# Patient Record
Sex: FEMALE | Race: WHITE | NOT HISPANIC OR LATINO | URBAN - METROPOLITAN AREA
[De-identification: names, ages, dates, MRNs, and addresses within clinical notes are randomized per-mention and may not be internally consistent; named-entity substitution may affect disease eponyms.]

---

## 2017-07-17 RX ORDER — CLONAZEPAM 0.5 MG/1
0.5 TABLET ORAL DAILY
COMMUNITY

## 2017-07-17 RX ORDER — FLUOXETINE 10 MG/1
10 TABLET, FILM COATED ORAL DAILY
COMMUNITY

## 2017-07-19 ENCOUNTER — HOSPITAL ENCOUNTER (OUTPATIENT)
Facility: HOSPITAL | Age: 57
Setting detail: OUTPATIENT SURGERY
Discharge: HOME | End: 2017-07-19
Attending: INTERNAL MEDICINE | Admitting: INTERNAL MEDICINE
Payer: COMMERCIAL

## 2017-07-19 ENCOUNTER — GENERIC CONVERSION - ENCOUNTER (OUTPATIENT)
Dept: OTHER | Facility: OTHER | Age: 57
End: 2017-07-19

## 2017-07-19 ENCOUNTER — ANESTHESIA (OUTPATIENT)
Dept: PERIOP | Facility: HOSPITAL | Age: 57
End: 2017-07-19
Payer: COMMERCIAL

## 2017-07-19 ENCOUNTER — ANESTHESIA EVENT (OUTPATIENT)
Dept: PERIOP | Facility: HOSPITAL | Age: 57
End: 2017-07-19
Payer: COMMERCIAL

## 2017-07-19 VITALS
RESPIRATION RATE: 18 BRPM | TEMPERATURE: 97.8 F | WEIGHT: 181 LBS | DIASTOLIC BLOOD PRESSURE: 76 MMHG | HEIGHT: 69 IN | OXYGEN SATURATION: 100 % | HEART RATE: 68 BPM | SYSTOLIC BLOOD PRESSURE: 127 MMHG | BODY MASS INDEX: 26.81 KG/M2

## 2017-07-19 RX ORDER — PROPOFOL 10 MG/ML
INJECTION, EMULSION INTRAVENOUS AS NEEDED
Status: DISCONTINUED | OUTPATIENT
Start: 2017-07-19 | End: 2017-07-19 | Stop reason: SURG

## 2017-07-19 RX ORDER — SODIUM CHLORIDE, SODIUM LACTATE, POTASSIUM CHLORIDE, CALCIUM CHLORIDE 600; 310; 30; 20 MG/100ML; MG/100ML; MG/100ML; MG/100ML
125 INJECTION, SOLUTION INTRAVENOUS CONTINUOUS
Status: CANCELLED | OUTPATIENT
Start: 2017-07-19

## 2017-07-19 RX ORDER — SODIUM CHLORIDE, SODIUM LACTATE, POTASSIUM CHLORIDE, CALCIUM CHLORIDE 600; 310; 30; 20 MG/100ML; MG/100ML; MG/100ML; MG/100ML
INJECTION, SOLUTION INTRAVENOUS CONTINUOUS PRN
Status: DISCONTINUED | OUTPATIENT
Start: 2017-07-19 | End: 2017-07-19 | Stop reason: SURG

## 2017-07-19 RX ADMIN — PROPOFOL 50 MG: 10 INJECTION, EMULSION INTRAVENOUS at 09:58

## 2017-07-19 RX ADMIN — SODIUM CHLORIDE, SODIUM LACTATE, POTASSIUM CHLORIDE, AND CALCIUM CHLORIDE: .6; .31; .03; .02 INJECTION, SOLUTION INTRAVENOUS at 09:48

## 2017-07-19 RX ADMIN — PROPOFOL 20 MG: 10 INJECTION, EMULSION INTRAVENOUS at 09:59

## 2017-07-19 RX ADMIN — PROPOFOL 20 MG: 10 INJECTION, EMULSION INTRAVENOUS at 09:55

## 2017-07-19 RX ADMIN — PROPOFOL 30 MG: 10 INJECTION, EMULSION INTRAVENOUS at 09:52

## 2017-07-19 RX ADMIN — PROPOFOL 100 MG: 10 INJECTION, EMULSION INTRAVENOUS at 09:50

## 2018-08-06 ENCOUNTER — TRANSCRIBE ORDERS (OUTPATIENT)
Dept: ADMINISTRATIVE | Facility: HOSPITAL | Age: 58
End: 2018-08-06

## 2018-08-06 DIAGNOSIS — R09.89 BRUIT: Primary | ICD-10-CM

## 2018-08-08 ENCOUNTER — HOSPITAL ENCOUNTER (OUTPATIENT)
Dept: ULTRASOUND IMAGING | Facility: HOSPITAL | Age: 58
Discharge: HOME | End: 2018-08-08
Payer: COMMERCIAL

## 2018-08-08 DIAGNOSIS — R09.89 BRUIT: ICD-10-CM

## 2018-08-08 PROCEDURE — 93880 EXTRACRANIAL BILAT STUDY: CPT

## 2018-08-08 PROCEDURE — 93880 EXTRACRANIAL BILAT STUDY: CPT | Performed by: RADIOLOGY

## 2021-04-06 DIAGNOSIS — Z23 ENCOUNTER FOR IMMUNIZATION: ICD-10-CM

## 2022-12-19 ENCOUNTER — PREP FOR PROCEDURE (OUTPATIENT)
Dept: GASTROENTEROLOGY | Facility: CLINIC | Age: 62
End: 2022-12-19

## 2022-12-19 ENCOUNTER — TELEPHONE (OUTPATIENT)
Dept: GASTROENTEROLOGY | Facility: CLINIC | Age: 62
End: 2022-12-19

## 2022-12-19 DIAGNOSIS — Z86.010 HISTORY OF COLON POLYPS: Primary | ICD-10-CM

## 2022-12-19 NOTE — TELEPHONE ENCOUNTER
Scheduled date of colonoscopy (as of today): 2/23/23    Physician performing colonoscopy: Dr Ayde Garcia    Location of colonoscopy: [facility]

## 2022-12-19 NOTE — TELEPHONE ENCOUNTER
12/19/22  Screened by: Laly Infante    Referring Provider Dr Jono Ye: There is no height or weight on file to calculate BMI  Has patient been referred for a routine screening Colonoscopy? yes  Is the patient between 39-70 years old? yes      Previous Colonoscopy yes   If yes:    Date: 5 yrs ago    Facility:     Reason:       SCHEDULING STAFF: If the patient is between 45yrs-49yrs, please advise patient to confirm benefits/coverage with their insurance company for a routine screening colonoscopy, some insurance carriers will only cover at [address] or older  If the patient is over 66years old, please schedule an office visit  Does the patient want to see a Gastroenterologist prior to their procedure OR are they having any GI symptoms? no    Has the patient been hospitalized or had abdominal surgery in the past 6 months? no    Does the patient use supplemental oxygen? no    Does the patient take Coumadin, Lovenox, Plavix, Elliquis, Xarelto, or other blood thinning medication? no    Has the patient had a stroke, cardiac event, or stent placed in the past year? no     PT PASSED OA  SCHEDULING STAFF: If patient answers NO to above questions, then schedule procedure  If patient answers YES to above questions, then schedule office appointment  If patient is between 45yrs - 49yrs, please advise patient that we will have to confirm benefits & coverage with their insurance company for a routine screening colonoscopy  [id number]

## 2022-12-28 ENCOUNTER — APPOINTMENT (EMERGENCY)
Dept: VASCULAR ULTRASOUND | Facility: HOSPITAL | Age: 62
End: 2022-12-28

## 2022-12-28 ENCOUNTER — HOSPITAL ENCOUNTER (EMERGENCY)
Facility: HOSPITAL | Age: 62
Discharge: HOME | End: 2022-12-28
Attending: EMERGENCY MEDICINE

## 2022-12-28 ENCOUNTER — APPOINTMENT (EMERGENCY)
Dept: RADIOLOGY | Facility: HOSPITAL | Age: 62
End: 2022-12-28

## 2022-12-28 VITALS
RESPIRATION RATE: 20 BRPM | TEMPERATURE: 98.6 F | HEART RATE: 77 BPM | OXYGEN SATURATION: 95 % | SYSTOLIC BLOOD PRESSURE: 167 MMHG | DIASTOLIC BLOOD PRESSURE: 79 MMHG

## 2022-12-28 DIAGNOSIS — M25.561 POSTERIOR RIGHT KNEE PAIN: Primary | ICD-10-CM

## 2022-12-28 RX ORDER — IBUPROFEN 400 MG/1
400 TABLET ORAL ONCE
Status: COMPLETED | OUTPATIENT
Start: 2022-12-28 | End: 2022-12-28

## 2022-12-28 RX ADMIN — IBUPROFEN 400 MG: 400 TABLET ORAL at 11:23

## 2022-12-30 NOTE — ED PROVIDER NOTES
History  Chief Complaint   Patient presents with   • Leg Pain     Pt c/o R leg pain that started yesterday, denies injury or recent travel     38-year-old female presenting with right posterior knee pain since yesterday  Patient denies any injury or twisting or any recent travel  Patient has not been sedentary and has been actively moving around  Patient has no history of blood clots  Is not on any blood thinners or aspirin  Denies any swelling or redness in the area but states that it hurts to palpation on the posterior side of her knee  Patient denies any fever, chest pain, shortness of breath, nausea, vomiting, diarrhea, constipation  Patient states that she does not know if she is getting tick bites  But has not been outside recently  Prior to Admission Medications   Prescriptions Last Dose Informant Patient Reported? Taking? FLUoxetine (PROzac) 10 MG tablet   Yes No   Sig: Take 10 mg by mouth daily   clonazePAM (KlonoPIN) 0 5 mg tablet   Yes No   Sig: Take 0 5 mg by mouth daily      Facility-Administered Medications: None       Past Medical History:   Diagnosis Date   • Anxiety    • Panic attacks    • Panic attacks        Past Surgical History:   Procedure Laterality Date   • APPENDECTOMY     •  SECTION     • COLONOSCOPY     • OR COLONOSCOPY FLX DX W/COLLJ SPEC WHEN PFRMD N/A 2017    Procedure: COLONOSCOPY;  Surgeon: Kathy Liu MD;  Location: [facility]; Service: Gastroenterology       History reviewed  No pertinent family history  I have reviewed and agree with the history as documented  E-Cigarette/Vaping     E-Cigarette/Vaping Substances          Review of Systems   Constitutional: Negative for chills and fever  HENT: Negative for ear pain and sore throat  Eyes: Negative for pain and visual disturbance  Respiratory: Negative for cough and shortness of breath  Cardiovascular: Negative for chest pain and palpitations     Gastrointestinal: Negative for abdominal pain, constipation, diarrhea, nausea and vomiting  Genitourinary: Negative for dysuria and hematuria  Musculoskeletal: Positive for arthralgias  Negative for back pain, gait problem, joint swelling, myalgias, neck pain and neck stiffness  Skin: Negative for color change and rash  Neurological: Negative for dizziness, seizures, syncope, weakness and headaches  All other systems reviewed and are negative  Physical Exam  Physical Exam  Vitals and nursing note reviewed  Constitutional:       General: She is not in acute distress  Appearance: She is well-developed  She is not ill-appearing  HENT:      Head: Normocephalic and atraumatic  Eyes:      Conjunctiva/sclera: Conjunctivae normal    Cardiovascular:      Rate and Rhythm: Normal rate and regular rhythm  Pulses: Normal pulses  Heart sounds: Normal heart sounds  No murmur heard  No friction rub  No gallop  Pulmonary:      Effort: Pulmonary effort is normal  No respiratory distress  Breath sounds: Normal breath sounds  Abdominal:      Palpations: Abdomen is soft  Tenderness: There is no abdominal tenderness  Musculoskeletal:         General: No swelling, tenderness, deformity or signs of injury  Cervical back: Neck supple  Right lower leg: No edema  Left lower leg: No edema  Skin:     General: Skin is warm and dry  Capillary Refill: Capillary refill takes less than 2 seconds  Coloration: Skin is not jaundiced or pale  Findings: No bruising, erythema, lesion or rash  Neurological:      Mental Status: She is alert     Psychiatric:         Mood and Affect: Mood normal          Vital Signs  ED Triage Vitals   Temperature Pulse Respirations Blood Pressure SpO2   12/28/22 1053 12/28/22 1053 12/28/22 1053 12/28/22 1053 12/28/22 1053   98 6 °F (37 °C) 77 20 167/79 95 %      Temp Source Heart Rate Source Patient Position - Orthostatic VS BP Location FiO2 (%)   12/28/22 1053 12/28/22 [address] 12/28/22 1053 12/28/22 1053 --   Oral Monitor Sitting Right arm       Pain Score       12/28/22 1123       7           Vitals:    12/28/22 1053   BP: 167/79   Pulse: 77   Patient Position - Orthostatic VS: Sitting         Visual Acuity      ED Medications  Medications   ibuprofen (MOTRIN) tablet 400 mg (400 mg Oral Given 12/28/22 1123)       Diagnostic Studies  Results Reviewed     None                 VAS lower limb venous duplex study, unilateral/limited   Final Result by Britt Mayen MD (12/28 6853)      XR knee 4+ vw right injury   ED Interpretation by Alberto Gomez PA-C (12/28 5816)   No acute fractures  Possible soft tissue swelling posterior knee  Final Result by Yvette Knight MD (12/28 5765)      No acute osseous abnormality  Workstation performed: [id number]                    Procedures  Procedures         ED Course                                             MDM  Number of Diagnoses or Management Options  Posterior right knee pain  Diagnosis management comments: 23-year-old female presenting with posterior right knee pain  This happened abruptly and had no precipitating cause  Patient does not have a history of any osteoarthritis  We will rule out any blood clots with vascular ultrasound of the left lower extremity  X-ray of the knees to rule out any fractures  No fracture found on x-ray  No blood clots found on the vascular ultrasound  We will send patient to orthopedics for further evaluation of possible osteoarthritis  Patient was agreeable to these terms and had at home medication that she was okay with taking        Disposition  Final diagnoses:   Posterior right knee pain     Time reflects when diagnosis was documented in both MDM as applicable and the Disposition within this note     Time User Action Codes Description Comment    12/28/2022 12:18 PM Neomia Duet Add [M25 561] Posterior right knee pain       ED Disposition     ED Disposition   Discharge Condition   Stable    Date/Time   Wed Dec 28, 2022 12:17 PM    Comment   Jane Ball discharge to home/self care                 Follow-up Information     Follow up With Specialties Details Why Contact Info Additional Information    [address] [facility] Emergency Medicine Go to  If symptoms worsen [address] [facility] [address] [facility], [address] [place], [place], [address], [place] Family Medicine Schedule an appointment as soon as possible for a visit  As needed [address] [place] [phone] [facility] [place] [facility] Schedule an appointment as soon as possible for a visit  As needed [address] [facility] [phone]  [address] [facility] [place], [address], [address] [place], [place], [address] [facility]          Discharge Medication List as of 12/28/2022 12:26 PM      CONTINUE these medications which have NOT CHANGED    Details   clonazePAM (KlonoPIN) 0 5 mg tablet Take 0 5 mg by mouth daily, Historical Med      FLUoxetine (PROzac) 10 MG tablet Take 10 mg by mouth daily, Historical Med                 PDMP Review     None          ED Provider  Electronically Signed by           Gideon Hernandez PA-C  12/30/22 7285

## 2023-01-04 ENCOUNTER — OFFICE VISIT (OUTPATIENT)
Dept: OBGYN CLINIC | Facility: HOSPITAL | Age: 63
End: 2023-01-04

## 2023-01-04 VITALS
BODY MASS INDEX: 26.73 KG/M2 | WEIGHT: 180.5 LBS | DIASTOLIC BLOOD PRESSURE: 88 MMHG | SYSTOLIC BLOOD PRESSURE: 146 MMHG | HEART RATE: 74 BPM | HEIGHT: 69 IN

## 2023-01-04 DIAGNOSIS — M25.561 POSTERIOR RIGHT KNEE PAIN: ICD-10-CM

## 2023-01-04 DIAGNOSIS — M17.11 PRIMARY OSTEOARTHRITIS OF RIGHT KNEE: Primary | ICD-10-CM

## 2023-01-04 NOTE — PROGRESS NOTES
Assessment:  1  Primary osteoarthritis of right knee        2  Posterior right knee pain  Ambulatory Referral to Orthopedic Surgery          Plan:  Right knee arthralgia now resolved  · Radiograph displays mild medial and patellofemoral arthritic changes  · Patient provided with right knee exercises  · The patient can follow up as needed and is welcome to return at any point with any new or old issue  To do next visit:  Return if symptoms worsen or fail to improve  The above stated was discussed in layman's terms and the patient expressed understanding  All questions were answered to the patient's satisfaction  Scribe Attestation    I,:  Alicia Card am acting as a scribe while in the presence of the attending physician :       I,:  Pippa Munson MD personally performed the services described in this documentation    as scribed in my presence :             Subjective:   Gael Solomon is a 57 y o  female who presents for initial evaluation of right knee  She is here from the ED 2022  She did have a flare of symptoms in which is now resolved  She is near % better since onset  Today she has no right knee complains yet did have significant posterior right knee pain and stiffness that eventually became entire knee pain  She did use IBU with benefit  She denies past injections or surgery  Review of systems negative unless otherwise specified in HPI    Past Medical History:   Diagnosis Date   • Anxiety    • Panic attacks    • Panic attacks        Past Surgical History:   Procedure Laterality Date   • APPENDECTOMY     •  SECTION     • COLONOSCOPY     • [place] COLONOSCOPY FLX DX W/COLLJ SPEC WHEN PFRMD N/A 2017    Procedure: COLONOSCOPY;  Surgeon: Ryan Soria MD;  Location: [facility]; Service: Gastroenterology       History reviewed  No pertinent family history      Social History     Occupational History   • Not on file   Tobacco Use   • Smoking status: Former     Types: Cigarettes   • Smokeless tobacco: Never   Vaping Use   • Vaping Use: Not on file   Substance and Sexual Activity   • Alcohol use: Not on file   • Drug use: Not on file   • Sexual activity: Not on file         Current Outpatient Medications:   •  clonazePAM (KlonoPIN) 0 5 mg tablet, Take 0 5 mg by mouth daily, Disp: , Rfl:   •  FLUoxetine (PROzac) 10 MG tablet, Take 10 mg by mouth daily, Disp: , Rfl:     No Known Allergies         Vitals:    01/04/23 1006   BP: 146/88   Pulse: 74       Objective:  Physical exam  · General: Awake, Alert, Oriented  · Eyes: Pupils equal, round and reactive to light  · Heart: regular rate and rhythm  · Lungs: No audible wheezing  · Abdomen: soft                    Ortho Exam  Right knee:  No erythema or ecchymosis  No effusion or swelling  Normal strength  Good ROM   Calf compartments soft and supple  Sensation intact  Toes are warm sensate and mobile      Diagnostics, reviewed and taken today if performed as documented: The attending physician has personally reviewed the pertinent films in PACS and interpretation is as follows:  Right knee x-ray:  Mild medial and patellofemoral compartment arthritic changes  Procedures, if performed today:    Procedures    None performed      Portions of the record may have been created with voice recognition software  Occasional wrong word or "sound a like" substitutions may have occurred due to the inherent limitations of voice recognition software  Read the chart carefully and recognize, using context, where substitutions have occurred

## 2023-02-16 ENCOUNTER — TELEPHONE (OUTPATIENT)
Dept: GASTROENTEROLOGY | Facility: CLINIC | Age: 63
End: 2023-02-16

## 2023-02-16 NOTE — TELEPHONE ENCOUNTER
Patients GI provider:  Dr Roge Zayas    Number to return call: [phone]     Reason for call: Pt called in regarding her prep information  Pt states she has not received her prep instructions yet and her procedure is scheduled for 2/23/2023  Pt would like prep sent to her email       Scheduled procedure/appointment date if applicable: 2/59/9926

## 2023-02-23 ENCOUNTER — ANESTHESIA (OUTPATIENT)
Dept: GASTROENTEROLOGY | Facility: HOSPITAL | Age: 63
End: 2023-02-23

## 2023-02-23 ENCOUNTER — HOSPITAL ENCOUNTER (OUTPATIENT)
Dept: GASTROENTEROLOGY | Facility: HOSPITAL | Age: 63
Setting detail: OUTPATIENT SURGERY
Discharge: HOME | End: 2023-02-23
Attending: INTERNAL MEDICINE | Admitting: INTERNAL MEDICINE

## 2023-02-23 ENCOUNTER — ANESTHESIA EVENT (OUTPATIENT)
Dept: GASTROENTEROLOGY | Facility: HOSPITAL | Age: 63
End: 2023-02-23

## 2023-02-23 VITALS
HEART RATE: 62 BPM | DIASTOLIC BLOOD PRESSURE: 61 MMHG | WEIGHT: 182.54 LBS | BODY MASS INDEX: 27.04 KG/M2 | TEMPERATURE: 98.7 F | HEIGHT: 69 IN | RESPIRATION RATE: 16 BRPM | SYSTOLIC BLOOD PRESSURE: 142 MMHG | OXYGEN SATURATION: 98 %

## 2023-02-23 DIAGNOSIS — Z86.010 HISTORY OF COLON POLYPS: ICD-10-CM

## 2023-02-23 RX ORDER — SODIUM CHLORIDE, SODIUM LACTATE, POTASSIUM CHLORIDE, CALCIUM CHLORIDE 600; 310; 30; 20 MG/100ML; MG/100ML; MG/100ML; MG/100ML
125 INJECTION, SOLUTION INTRAVENOUS CONTINUOUS
Status: CANCELLED | OUTPATIENT
Start: 2023-02-23

## 2023-02-23 RX ORDER — SODIUM CHLORIDE, SODIUM LACTATE, POTASSIUM CHLORIDE, CALCIUM CHLORIDE 600; 310; 30; 20 MG/100ML; MG/100ML; MG/100ML; MG/100ML
125 INJECTION, SOLUTION INTRAVENOUS CONTINUOUS
Status: DISCONTINUED | OUTPATIENT
Start: 2023-02-23 | End: 2023-02-27 | Stop reason: HOSPADM

## 2023-02-23 RX ORDER — SODIUM CHLORIDE, SODIUM LACTATE, POTASSIUM CHLORIDE, CALCIUM CHLORIDE 600; 310; 30; 20 MG/100ML; MG/100ML; MG/100ML; MG/100ML
INJECTION, SOLUTION INTRAVENOUS CONTINUOUS PRN
Status: DISCONTINUED | OUTPATIENT
Start: 2023-02-23 | End: 2023-02-23

## 2023-02-23 RX ORDER — PROPOFOL 10 MG/ML
INJECTION, EMULSION INTRAVENOUS AS NEEDED
Status: DISCONTINUED | OUTPATIENT
Start: 2023-02-23 | End: 2023-02-23

## 2023-02-23 RX ADMIN — PROPOFOL 30 MG: 10 INJECTION, EMULSION INTRAVENOUS at 11:12

## 2023-02-23 RX ADMIN — PROPOFOL 100 MG: 10 INJECTION, EMULSION INTRAVENOUS at 11:07

## 2023-02-23 RX ADMIN — PROPOFOL 20 MG: 10 INJECTION, EMULSION INTRAVENOUS at 11:09

## 2023-02-23 RX ADMIN — SODIUM CHLORIDE, SODIUM LACTATE, POTASSIUM CHLORIDE, AND CALCIUM CHLORIDE: .6; .31; .03; .02 INJECTION, SOLUTION INTRAVENOUS at 10:40

## 2023-02-23 RX ADMIN — SODIUM CHLORIDE, SODIUM LACTATE, POTASSIUM CHLORIDE, AND CALCIUM CHLORIDE 125 ML/HR: .6; .31; .03; .02 INJECTION, SOLUTION INTRAVENOUS at 10:31

## 2023-02-23 RX ADMIN — PROPOFOL 20 MG: 10 INJECTION, EMULSION INTRAVENOUS at 11:16

## 2023-02-23 NOTE — ANESTHESIA POSTPROCEDURE EVALUATION
Post-Op Assessment Note    CV Status:  Stable  Pain Score: 0    Pain management: adequate     Mental Status:  Alert and awake   Hydration Status:  Stable   PONV Controlled:  None   Airway Patency:  Patent and adequate      Post Op Vitals Reviewed: Yes      Staff: Anesthesiologist, CRNA         No notable events documented

## 2023-02-23 NOTE — H&P
History and Physical - [facility]  Charline Starkey 57 y o  female MRN: [id number]                  HPI: Charline Starkey is a 57y o  year old female who presents for colonoscopy for history of colon polyps      REVIEW OF SYSTEMS: Per the HPI, and otherwise unremarkable  Historical Information   Past Medical History:   Diagnosis Date   • Anxiety    • Panic attacks    • Panic attacks      Past Surgical History:   Procedure Laterality Date   • APPENDECTOMY     •  SECTION     • COLONOSCOPY     • [place] COLONOSCOPY FLX DX W/COLLJ SPEC WHEN PFRMD N/A 2017    Procedure: COLONOSCOPY;  Surgeon: Naresh Brock MD;  Location: [facility]; Service: Gastroenterology     Social History   Social History     Substance and Sexual Activity   Alcohol Use Yes    Comment: OCCASIONAL     Social History     Substance and Sexual Activity   Drug Use Never     Social History     Tobacco Use   Smoking Status Former   • Types: Cigarettes   • Quit date:    • Years since quitting: 15 1   Smokeless Tobacco Never     History reviewed  No pertinent family history  Meds/Allergies     (Not in a hospital admission)      No Known Allergies    Objective     Blood pressure 144/88, pulse 70, temperature (!) 97 3 °F (36 3 °C), temperature source Temporal, resp  rate 16, height 5' 9" (1 753 m), weight 82 8 kg (182 lb 8 7 oz), SpO2 98 %  PHYSICAL EXAM    /88   Pulse 70   Temp (!) 97 3 °F (36 3 °C) (Temporal)   Resp 16   Ht 5' 9" (1 753 m)   Wt 82 8 kg (182 lb 8 7 oz)   SpO2 98%   BMI 26 96 kg/m²       Gen: NAD  CV: RRR  CHEST: Clear  ABD: soft, NT/ND  EXT: no edema      ASSESSMENT/PLAN:  This is a 57y o  year old female here for colonoscopy, and she is stable and optimized for her procedure

## 2023-02-23 NOTE — ANESTHESIA PREPROCEDURE EVALUATION
Procedure:  COLONOSCOPY    Relevant Problems   ANESTHESIA (within normal limits)   (-) History of anesthesia complications      CARDIO (within normal limits)      ENDO (within normal limits)      GI/HEPATIC  Confirmed NPO appropriate  s/p bowel prep      /RENAL (within normal limits)      HEMATOLOGY (within normal limits)      MUSCULOSKELETAL   (+) Primary osteoarthritis of right knee      NEURO/PSYCH (within normal limits)      PULMONARY (within normal limits)   (-) Smoking   (-) URI (upper respiratory infection)        Physical Exam    Airway    Mallampati score: II  TM Distance: >3 FB  Neck ROM: full     Dental       Cardiovascular  Rhythm: regular, Rate: normal,     Pulmonary  Breath sounds clear to auscultation,     Other Findings        Anesthesia Plan  ASA Score- 1     Anesthesia Type- IV sedation with anesthesia with ASA Monitors  Additional Monitors:   Airway Plan:     Comment: I discussed the risks and benefits of IV sedation anesthesia including the possibility of the need to convert to general anesthesia and the potential risk of awareness  The patient was given the opportunity to ask questions, which were answered          Plan Factors-Exercise tolerance (METS): >4 METS  Chart reviewed  Patient is not a current smoker  Induction- intravenous  Postoperative Plan-     Informed Consent- Anesthetic plan and risks discussed with patient and spouse  I personally reviewed this patient with the CRNA  Discussed and agreed on the Anesthesia Plan with the CRNA  Courtney Moreno

## 2023-12-04 ENCOUNTER — HOSPITAL ENCOUNTER (EMERGENCY)
Facility: HOSPITAL | Age: 63
Discharge: HOME | End: 2023-12-04
Attending: EMERGENCY MEDICINE
Payer: MEDICARE

## 2023-12-04 ENCOUNTER — APPOINTMENT (EMERGENCY)
Dept: RADIOLOGY | Facility: HOSPITAL | Age: 63
End: 2023-12-04
Payer: MEDICARE

## 2023-12-04 VITALS
DIASTOLIC BLOOD PRESSURE: 71 MMHG | SYSTOLIC BLOOD PRESSURE: 148 MMHG | HEART RATE: 59 BPM | OXYGEN SATURATION: 97 % | BODY MASS INDEX: 29.17 KG/M2 | WEIGHT: 197.53 LBS | TEMPERATURE: 97.9 F | RESPIRATION RATE: 16 BRPM

## 2023-12-04 DIAGNOSIS — R07.9 CHEST PAIN: Primary | ICD-10-CM

## 2023-12-04 DIAGNOSIS — I16.0 HYPERTENSIVE URGENCY: ICD-10-CM

## 2023-12-04 LAB
2HR DELTA HS TROPONIN: 0 NG/L
ALBUMIN SERPL BCP-MCNC: 3.9 G/DL (ref 3.5–5)
ALP SERPL-CCNC: 75 U/L (ref 34–104)
ALT SERPL W P-5'-P-CCNC: 22 U/L (ref 7–52)
ANION GAP SERPL CALCULATED.3IONS-SCNC: 3 MMOL/L
AST SERPL W P-5'-P-CCNC: 20 U/L (ref 13–39)
ATRIAL RATE: 65 BPM
BASOPHILS # BLD AUTO: 0.05 THOUSANDS/ÂΜL (ref 0–0.1)
BASOPHILS NFR BLD AUTO: 1 % (ref 0–1)
BILIRUB SERPL-MCNC: 0.46 MG/DL (ref 0.2–1)
BUN SERPL-MCNC: 19 MG/DL (ref 5–25)
CALCIUM SERPL-MCNC: 9.2 MG/DL (ref 8.4–10.2)
CARDIAC TROPONIN I PNL SERPL HS: 3 NG/L
CARDIAC TROPONIN I PNL SERPL HS: 3 NG/L
CHLORIDE SERPL-SCNC: 105 MMOL/L (ref 96–108)
CO2 SERPL-SCNC: 29 MMOL/L (ref 21–32)
CREAT SERPL-MCNC: 0.83 MG/DL (ref 0.6–1.3)
EOSINOPHIL # BLD AUTO: 0.09 THOUSAND/ÂΜL (ref 0–0.61)
EOSINOPHIL NFR BLD AUTO: 2 % (ref 0–6)
ERYTHROCYTE [DISTWIDTH] IN BLOOD BY AUTOMATED COUNT: 12.3 % (ref 11.6–15.1)
GFR SERPL CREATININE-BSD FRML MDRD: 74 ML/MIN/1.73SQ M
GLUCOSE SERPL-MCNC: 101 MG/DL (ref 65–140)
HCT VFR BLD AUTO: 40 % (ref 34.8–46.1)
HGB BLD-MCNC: 13.3 G/DL (ref 11.5–15.4)
IMM GRANULOCYTES # BLD AUTO: 0.01 THOUSAND/UL (ref 0–0.2)
IMM GRANULOCYTES NFR BLD AUTO: 0 % (ref 0–2)
LYMPHOCYTES # BLD AUTO: 1.58 THOUSANDS/ÂΜL (ref 0.6–4.47)
LYMPHOCYTES NFR BLD AUTO: 28 % (ref 14–44)
MCH RBC QN AUTO: 30.4 PG (ref 26.8–34.3)
MCHC RBC AUTO-ENTMCNC: 33.3 G/DL (ref 31.4–37.4)
MCV RBC AUTO: 92 FL (ref 82–98)
MONOCYTES # BLD AUTO: 0.38 THOUSAND/ÂΜL (ref 0.17–1.22)
MONOCYTES NFR BLD AUTO: 7 % (ref 4–12)
NEUTROPHILS # BLD AUTO: 3.47 THOUSANDS/ÂΜL (ref 1.85–7.62)
NEUTS SEG NFR BLD AUTO: 62 % (ref 43–75)
NRBC BLD AUTO-RTO: 0 /100 WBCS
P AXIS: 52 DEGREES
PLATELET # BLD AUTO: 307 THOUSANDS/UL (ref 149–390)
PMV BLD AUTO: 9.7 FL (ref 8.9–12.7)
POTASSIUM SERPL-SCNC: 4.5 MMOL/L (ref 3.5–5.3)
PR INTERVAL: 152 MS
PROT SERPL-MCNC: 7 G/DL (ref 6.4–8.4)
QRS AXIS: 14 DEGREES
QRSD INTERVAL: 88 MS
QT INTERVAL: 386 MS
QTC INTERVAL: 401 MS
RBC # BLD AUTO: 4.37 MILLION/UL (ref 3.81–5.12)
SODIUM SERPL-SCNC: 137 MMOL/L (ref 135–147)
T WAVE AXIS: 62 DEGREES
VENTRICULAR RATE: 65 BPM
WBC # BLD AUTO: 5.58 THOUSAND/UL (ref 4.31–10.16)

## 2023-12-04 PROCEDURE — 99285 EMERGENCY DEPT VISIT HI MDM: CPT | Performed by: EMERGENCY MEDICINE

## 2023-12-04 PROCEDURE — 71045 X-RAY EXAM CHEST 1 VIEW: CPT

## 2023-12-04 PROCEDURE — 99285 EMERGENCY DEPT VISIT HI MDM: CPT

## 2023-12-04 PROCEDURE — 36415 COLL VENOUS BLD VENIPUNCTURE: CPT | Performed by: EMERGENCY MEDICINE

## 2023-12-04 PROCEDURE — 96374 THER/PROPH/DIAG INJ IV PUSH: CPT

## 2023-12-04 PROCEDURE — 80053 COMPREHEN METABOLIC PANEL: CPT | Performed by: EMERGENCY MEDICINE

## 2023-12-04 PROCEDURE — 93005 ELECTROCARDIOGRAM TRACING: CPT

## 2023-12-04 PROCEDURE — 84484 ASSAY OF TROPONIN QUANT: CPT | Performed by: EMERGENCY MEDICINE

## 2023-12-04 PROCEDURE — 85025 COMPLETE CBC W/AUTO DIFF WBC: CPT | Performed by: EMERGENCY MEDICINE

## 2023-12-04 RX ORDER — ONDANSETRON 2 MG/ML
4 INJECTION INTRAMUSCULAR; INTRAVENOUS ONCE
Status: COMPLETED | OUTPATIENT
Start: 2023-12-04 | End: 2023-12-04

## 2023-12-04 RX ADMIN — ONDANSETRON 4 MG: 2 INJECTION INTRAMUSCULAR; INTRAVENOUS at 10:23

## 2023-12-04 NOTE — DISCHARGE INSTRUCTIONS
Today you were seen in the emergency department for chest pain. Your workup included labs, EKG, chest x-ray, troponin. I believe your symptoms to be the result of noncardiac chest pain. At this time there does not appear to be an emergent life threatening cause to explain your symptoms. You are stable for discharge. Please follow up with your primary care provider in the next 2-3 days. Please review all results discussed today with your primary care provider. Please discuss with them with regards to further testing/cardiac outpatient follow-up. Please return to the emergency department as soon as possible if you develop uncontrollable fevers (Temp >100.4), uncontrollable pain, vomiting, chest pain, trouble breathing, passing out, sweating with chest pain or any other concerning symptoms. Thank you for choosing [facility] for your care.

## 2023-12-04 NOTE — ED PROVIDER NOTES
History  Chief Complaint   Patient presents with    Chest Pain     Pt presents to the ED with chest pain x 2hrs last night with nausea. Pt reports familial hx of cardiac problems. Pt reports having pain down her left arm the last 2 days. 57-year-old female with history of hypertension, anxiety presenting to the ED with complaints of intermittent left-sided chest pain associated with left arm pain and nausea for the past 2 days. Patient states that she has been having left-sided arm pain that radiates to her chest for the past 2 days. Pain is constant when it occurs, however occurs intermittently and randomly. Pain is unchanged with exertion. Last episode of pain was last night, and awoke her from sleep and reports mild nausea, shortness of breath and lightheadedness with it. She describes as a tightness and burning pain. she states the pain went away on its own, denies any new medication for the pain. She has a history of chronic neck pain with radiation of pain to her left arm, but this feels different. She does report some mild nausea with that as well associated with mild shortness of breath. At this time she only complains of mild nausea. She denies recent fevers, chills, headache, new/worsening neck pain/neck stiffness, visual changes, focal weakness, syncope, PND, palpitations, orthopnea, back pain, abdominal pain, vomiting, diarrhea, bloody/tarry stools, dysuria, hematuria, urgency, leg pain, leg swelling. Denies recent travel, recent surgeries, history of PE/DVT, or treatment for malignancy. This reports positive family history of cardiac disease, however is unsure exactly of diagnosis but states that her brother had a pacemaker placed at 71years old. Prior to Admission Medications   Prescriptions Last Dose Informant Patient Reported? Taking?    FLUoxetine (PROzac) 10 MG tablet   Yes No   Sig: Take 10 mg by mouth daily   clonazePAM (KlonoPIN) 0.5 mg tablet   Yes No   Sig: Take 0.5 mg by mouth daily      Facility-Administered Medications: None       Past Medical History:   Diagnosis Date    Anxiety     Panic attacks     Panic attacks        Past Surgical History:   Procedure Laterality Date    APPENDECTOMY       SECTION      COLONOSCOPY      MI COLONOSCOPY FLX DX W/COLLJ SPEC WHEN PFRMD N/A 2017    Procedure: COLONOSCOPY;  Surgeon: Linda Toro MD;  Location: [facility]; Service: Gastroenterology       History reviewed. No pertinent family history. I have reviewed and agree with the history as documented. E-Cigarette/Vaping     E-Cigarette/Vaping Substances    Nicotine No     THC No     CBD No     Flavoring No     Other No     Unknown No      Social History     Tobacco Use    Smoking status: Former     Types: Cigarettes     Quit date:      Years since quitting: 15.9    Smokeless tobacco: Never   Substance Use Topics    Alcohol use: Yes     Comment: OCCASIONAL    Drug use: Never        Review of Systems   Constitutional:  Negative for chills and fever. HENT:  Negative for congestion and sore throat. Eyes:  Negative for photophobia and visual disturbance. Respiratory:  Positive for chest tightness and shortness of breath. Negative for cough. Cardiovascular:  Positive for chest pain. Negative for palpitations and leg swelling. Gastrointestinal:  Positive for nausea. Negative for abdominal pain, diarrhea and vomiting. Genitourinary:  Negative for decreased urine volume, difficulty urinating, dysuria, flank pain, hematuria and vaginal bleeding. Musculoskeletal:  Positive for neck pain (Chronic.). Negative for back pain and neck stiffness. Skin:  Negative for pallor, rash and wound. Neurological:  Positive for light-headedness. Negative for dizziness, tremors, seizures, syncope, speech difficulty, weakness, numbness and headaches.        Physical Exam  ED Triage Vitals [23 0945]   Temperature Pulse Respirations Blood Pressure SpO2   97.9 °F (36.6 °C) 67 16 (!) 201/98 98 %      Temp Source Heart Rate Source Patient Position - Orthostatic VS BP Location FiO2 (%)   Oral Monitor Sitting Right arm --      Pain Score       2             Orthostatic Vital Signs  Vitals:    12/04/23 1100 12/04/23 1130 12/04/23 1200 12/04/23 1230   BP: (!) 172/79 158/73 (!) 182/79 162/77   Pulse: 60 57 59 57   Patient Position - Orthostatic VS: Sitting Sitting Sitting Sitting       Physical Exam  Vitals and nursing note reviewed. Constitutional:       General: She is not in acute distress. Appearance: She is well-developed and normal weight. She is not ill-appearing, toxic-appearing or diaphoretic. HENT:      Head: Normocephalic and atraumatic. Eyes:      General: No visual field deficit. Extraocular Movements: Extraocular movements intact. Pupils: Pupils are equal, round, and reactive to light. Cardiovascular:      Rate and Rhythm: Normal rate and regular rhythm. No extrasystoles are present. Pulses:           Radial pulses are 2+ on the right side and 2+ on the left side. Dorsalis pedis pulses are 2+ on the right side and 2+ on the left side. Posterior tibial pulses are 2+ on the right side and 2+ on the left side. Heart sounds: Normal heart sounds. No murmur heard. No systolic murmur is present. No friction rub. No gallop. No S3 sounds. Pulmonary:      Effort: No tachypnea, accessory muscle usage or respiratory distress. Breath sounds: Normal breath sounds. No decreased breath sounds, wheezing, rhonchi or rales. Chest:      Chest wall: No mass, tenderness or crepitus. Abdominal:      General: There is no abdominal bruit. Palpations: Abdomen is soft. Tenderness: There is no abdominal tenderness. There is no guarding or rebound. Musculoskeletal:         General: Normal range of motion. Cervical back: Normal range of motion and neck supple. Right lower leg: No tenderness. No edema.       Left lower leg: No tenderness. No edema. Comments: Negative Spurling's test.  Midline C-spine tenderness (consistent with patient's chronic neck pain). No midline T/L-spine tenderness. No calf pain on Squeeze. Lymphadenopathy:      Cervical: No cervical adenopathy. Skin:     General: Skin is warm and dry. Capillary Refill: Capillary refill takes less than 2 seconds. Coloration: Skin is not cyanotic or pale. Findings: No ecchymosis, erythema or rash. Nails: There is no clubbing. Neurological:      General: No focal deficit present. Mental Status: She is alert and oriented to person, place, and time. GCS: GCS eye subscore is 4. GCS verbal subscore is 5. GCS motor subscore is 6. Cranial Nerves: Cranial nerves 2-12 are intact. No cranial nerve deficit, dysarthria or facial asymmetry. Sensory: Sensation is intact. Motor: Motor function is intact. No weakness. Coordination: Coordination is intact. Gait: Gait is intact. Psychiatric:         Mood and Affect: Mood normal. Mood is not anxious. Behavior: Behavior normal. Behavior is not agitated.          ED Medications  Medications   ondansetron (ZOFRAN) injection 4 mg (4 mg Intravenous Given 12/4/23 1023)       Diagnostic Studies  Results Reviewed       Procedure Component Value Units Date/Time    HS Troponin I 2hr [[id number]]  (Normal) Collected: 12/04/23 1225    Lab Status: Final result Specimen: Blood from Arm, Left Updated: 12/04/23 1300     hs TnI 2hr 3 ng/L      Delta 2hr hsTnI 0 ng/L     HS Troponin I 4hr [[id number]]     Lab Status: No result Specimen: Blood     HS Troponin 0hr (reflex protocol) [[id number]]  (Normal) Collected: 12/04/23 1022    Lab Status: Final result Specimen: Blood from Arm, Left Updated: 12/04/23 1102     hs TnI 0hr 3 ng/L     Comprehensive metabolic panel [[id number]] Collected: 12/04/23 1022    Lab Status: Final result Specimen: Blood from Arm, Left Updated: 12/04/23 1058 Sodium 137 mmol/L      Potassium 4.5 mmol/L      Chloride 105 mmol/L      CO2 29 mmol/L      ANION GAP 3 mmol/L      BUN 19 mg/dL      Creatinine 0.83 mg/dL      Glucose 101 mg/dL      Calcium 9.2 mg/dL      AST 20 U/L      ALT 22 U/L      Alkaline Phosphatase 75 U/L      Total Protein 7.0 g/dL      Albumin 3.9 g/dL      Total Bilirubin 0.46 mg/dL      eGFR 74 ml/min/1.73sq m     Narrative:      National Kid[employer] Disease Foundation guidelines for Chronic Kidney Disease (CKD):     Stage 1 with normal or high GFR (GFR > 90 mL/min/1.73 square meters)    Stage 2 Mild CKD (GFR = 60-89 mL/min/1.73 square meters)    Stage 3A Moderate CKD (GFR = 45-59 mL/min/1.73 square meters)    Stage 3B Moderate CKD (GFR = 30-44 mL/min/1.73 square meters)    Stage 4 Severe CKD (GFR = 15-29 mL/min/1.73 square meters)    Stage 5 End Stage CKD (GFR <15 mL/min/1.73 square meters)  Note: GFR calculation is accurate only with a steady state creatinine    CBC and differential [[id number]] Collected: 12/04/23 1022    Lab Status: Final result Specimen: Blood from Arm, Left Updated: 12/04/23 1041     WBC 5.58 Thousand/uL      RBC 4.37 Million/uL      Hemoglobin 13.3 g/dL      Hematocrit 40.0 %      MCV 92 fL      MCH 30.4 pg      MCHC 33.3 g/dL      RDW 12.3 %      MPV 9.7 fL      Platelets 754 Thousands/uL      nRBC 0 /100 WBCs      Neutrophils Relative 62 %      Immat GRANS % 0 %      Lymphocytes Relative 28 %      Monocytes Relative 7 %      Eosinophils Relative 2 %      Basophils Relative 1 %      Neutrophils Absolute 3.47 Thousands/µL      Immature Grans Absolute 0.01 Thousand/uL      Lymphocytes Absolute 1.58 Thousands/µL      Monocytes Absolute 0.38 Thousand/µL      Eosinophils Absolute 0.09 Thousand/µL      Basophils Absolute 0.05 Thousands/µL                    XR chest 1 view portable   ED Interpretation by Arsen Miranda DO (12/04 1031)   Lungs are clear without focal consolidations or pneumothorax.   Cardiomediastinal silhouette appears normal.  No acute osseous abnormality. Final Result by Kofi Mercado MD (12/04 1141)      No acute cardiopulmonary disease. Workstation performed: [id number]               Procedures  ECG 12 Lead Documentation Only    Date/Time: 12/4/2023 9:50 AM    Performed by: Fady Ray DO  Authorized by: Fady Ray DO    Patient location:  ED  Previous ECG:     Previous ECG:  Unavailable  Interpretation:     Interpretation: normal    Rate:     ECG rate:  65    ECG rate assessment: normal    Rhythm:     Rhythm: sinus rhythm    Ectopy:     Ectopy: none    QRS:     QRS axis:  Normal    QRS intervals:  Normal  Conduction:     Conduction: normal    ST segments:     ST segments:  Normal  T waves:     T waves: normal          ED Course  ED Course as of 12/04/23 1312   Mon Dec 04, 2023   0946 Blood Pressure(!): 201/98   0950 Independently interpreted patient's EKG showing normal sinus rhythm at 65 bpm.  Normal axis. Normal intervals. No acute ischemic changes. No prior EKG for comparison. 1016 Blood Pressure(!): 176/83   1104 hs TnI 0hr: 3  Patient states that her pain occurred within 3 -4 hours of arrival to the emergency department. We will recheck delta troponin. 1104 Comprehensive metabolic panel  No acute electrolyte abnormality. Renal function at baseline. LFTs WNL. 1105 Blood Pressure: 159/74  BP improved without intervention. No evidence of endorgan damage at this time. Shared decision making was made to hold off on CT imaging as patient is alert and oriented x4 at her baseline mental status without any focal neurological deficits or headache. 1112 Reviewed all results with patient. Patient reports no recurrent chest pain. Currently asymptomatic, nausea resolved. Initial troponin of 3, will wait for delta given onset of symptoms.    [facility] hsTnI: 0             HEART Risk Score      Flowsheet Row Most Recent Value   Heart Score Risk Calculator    History 1 Filed at: 12/04/2023 1113   ECG 0 Filed at: 12/04/2023 1113   Age 2 Filed at: 12/04/2023 1113   Risk Factors 1 Filed at: 12/04/2023 1113   Troponin 0 Filed at: 12/04/2023 1113   HEART Score 4 Filed at: 12/04/2023 1113                PERC Rule for PE      Flowsheet Row Most Recent Value   PERC Rule for PE    Age >=50 1 Filed at: 12/04/2023 1312   HR >=100 0 Filed at: 12/04/2023 1312   O2 Sat on room air < 95% 0 Filed at: 12/04/2023 1312   History of PE or DVT 0 Filed at: 12/04/2023 1312   Recent trauma or surgery 0 Filed at: 12/04/2023 1312   Hemoptysis 0 Filed at: 12/04/2023 1312   Exogenous estrogen 0 Filed at: 12/04/2023 1312   Unilateral leg swelling 0 Filed at: 12/04/2023 1312   PERC Rule for PE Results 1 Filed at: 12/04/2023 1312                    Wells' Criteria for PE      Flowsheet Row Most Recent Value   Wells' Criteria for PE    Clinical signs and symptoms of DVT 0 Filed at: 12/04/2023 1312   PE is primary diagnosis or equally likely 0 Filed at: 12/04/2023 1312   HR >100 0 Filed at: 12/04/2023 1312   Immobilization at least 3 days or Surgery in the previous 4 weeks 0 Filed at: 12/04/2023 1312   Previous, objectively diagnosed PE or DVT 0 Filed at: 12/04/2023 1312   Hemoptysis 0 Filed at: 12/04/2023 1312   Malignancy with treatment within 6 months or palliative 0 Filed at: 12/04/2023 1312   Wells' Criteria Total 0 Filed at: 12/04/2023 1312              Medical Decision Making  Patient with history as above presented to triage with CC of " Patient presents with:  Chest Pain: Pt presents to the ED with chest pain x 2hrs last night with nausea. Pt reports familial hx of cardiac problems. Pt reports having pain down her left arm the last 2 days.    "    Hx obtained from pt and spouse  57-year-old female presenting with intermittent burning/tight left-sided chest pain radiating to her left arm associated with nausea, lightheadedness, dizziness and shortness of breath.   Last episode of pain was 3 to 4 hours prior to arrival.  Minimal CAD risk factors (including age), Exam without evidence of volume overload. EKG without signs of active ischemia. HEART score: 4. Given the timing of pain to ER presentation, plan to send delta troponin to evaluate for NSTEMI. Presentation not consistent with acute PE ([facility] low risk), pneumothorax, thoracic arotic dissection, cardiac effusion or tamponade. Patient's only symptom at this time is mild nausea. No prior stress or cardiac cath. Plan: labs, troponin, EKG, CXR, pain control, serial reassessment, antiemetic    Patient's work-up essentially negative. Delta troponin 0. EKG normal sinus rhythm without acute ischemic changes. CBC and CMP unremarkable. BP initially elevated at 201/98, improved to 160/77 without intervention. Changes are making was made to hold off on imaging of the head, as patient is without headache, focal neurological deficits and is alert x4 at her baseline mental status. No evidence or signs of endorgan damage. At this time because of chest pain/arm pain unclear. Suspect possible underlying MSK issue to explain her left arm pain. Patient otherwise asymptomatic, with resolution of pain and nausea. She is otherwise stable for discharge with outpatient follow-up. Reviewed strict return precautions with patient and she is agreeable with plan. Advised to recheck her blood pressure with her primary care doctor along with all results discussed today. Patient was nontoxic appearing and stable. Exam as above. Ambulatory and Tolerating PO. I have independently ordered, reviewed and interpreted the following: labs and/or imaging studies listed above, EKG above    Reviewed external records including notes, and prior labs/imaging results. DDX including but not limited to: ACS, MI, PE, PTX, pneumonia, dissection, pleurisy, pericarditis, myocarditis, rhabdomyolysis, GI etiology. Differential diagnosis includes but not limited to:  Uncontrolled HTN, Pain, Anxiety, Autonomic dysreflexia, Dehydration/KATARINA, Volume overload, vertigo, metabolic derangement, TIA, LUCIA; Doubt MI, Aortic dissection, Coarctation, Hyperthyroidism, Acute CVA/ICH, Infection/sepsis, KIMBERLY, Overdose/Intoxication, acute glomerulonephritis, Pheochromocytoma, SAH. Patient was treated with improvement in symptoms from the following:  IV Zofran    Consideration was given for admission, but the patient was stable for outpatient management. Disposition: Discussed need for follow up with their primary doctor or specialist to review all results, including incidental findings as above. Patient discharged with explanation of ED workup and diagnosis, instructions on how to obtain outpatient follow up, care instructions at home, and strict return precautions if patient develops new or worsening symptoms. Patients questions answered and agreeable with discharge plan. See ED Course for further MDM. PLEASE NOTE:  This encounter was completed utilizing the [employer] Direct Speech Voice Recognition Software. Grammatical errors, random word insertions, pronoun errors and incomplete sentences are occasional inherent consequences of the system due to software limitations, ambient noise and hardware issues. These may be missed by proof reading prior to affixing electronic signature. Any questions or concerns about the content, text or information contained within the body of this dictation should be directly addressed to the physician for clarification. Please do not hesitate to call me directly if you have any questions or concerns. Amount and/or Complexity of Data Reviewed  Independent Historian: spouse  External Data Reviewed: labs, radiology and notes. Labs: ordered. Decision-making details documented in ED Course. Radiology: ordered and independent interpretation performed. Decision-making details documented in ED Course.   ECG/medicine tests: ordered and independent interpretation performed. Decision-making details documented in ED Course. Risk  Prescription drug management. Disposition  Final diagnoses:   Chest pain   Hypertensive urgency     Time reflects when diagnosis was documented in both MDM as applicable and the Disposition within this note       Time User Action Codes Description Comment    12/4/2023 11:20 AM Alla Hanson Add [R07.9] Chest pain     12/4/2023 12:31 PM Alla Hanson Add [I16.0] Hypertensive urgency           ED Disposition       ED Disposition   Discharge    Condition   Stable    Date/Time   Mon Dec 4, 2023 [address] [place] [address],[address] discharge to home/self care. Follow-up Information       Follow up With Specialties Details Why Contact Info    Luci Robles MD Family Medicine  Please call tomorrow to schedule an appointment [address]  [address] [place] [address]  [phone]              Patient's Medications   Discharge Prescriptions    No medications on file     No discharge procedures on file. PDMP Review       None             ED Provider  Attending physically available and evaluated Kyle Engel. I managed the patient along with the ED Attending.     Electronically Signed by           Alla Hanson DO  12/04/23 8733

## 2023-12-04 NOTE — ED ATTENDING ATTESTATION
12/4/2023  Ramona Mcduffie DO, saw and evaluated the patient. I have discussed the patient with the resident/non-physician practitioner and agree with the resident's/non-physician practitioner's findings, Plan of Care, and MDM as documented in the resident's/non-physician practitioner's note, except where noted. All available labs and Radiology studies were reviewed. I was present for key portions of any procedure(s) performed by the resident/non-physician practitioner and I was immediately available to provide assistance. At this point I agree with the current assessment done in the Emergency Department. I have conducted an independent evaluation of this patient a history and physical is as follows: 64-year-old female, past medical history per resident chart, presenting with 2 days of symptoms which woke her up from sleep early morning. Patient states initially she had left-sided chest pain that began while in bed. She notes that this pain self resolved. She notes that she was unable to modulate this pain with exertion nor was it accompanied with shortness of breath or pleurisy. An isolated manner, she then experienced left arm pain. This to self resolve. Lastly she experienced nausea on the separate morning that also self resolved. Patient volunteers that she recently learned of brothers cardiac pathology which includes a pacemaker but otherwise she is unaware of any early ACS in her family. Denies dyspnea exertion, chest pain on exertion, calf pain, bilateral extremity edema. Vital sign stable. Patient resting comfortably. Lungs clear to auscultation. No increased work of breathing. Abdomen soft nontender. Bilateral lower extremities without edema, calf tenderness, dissymmetry. 64-year-old female presenting with unclear etiology of multiple symptoms less likely related to ACS at this time given reassuring workup. Troponin performed x 2 without elevation.   EKG NSR without signs of ischemia, infarction, arrhythmia. Chest x-ray without acute pathology. Basic labs without abnormality. Given that these episodes were disconnected, less likely ACS however patient provided follow-up to cardiology. Resident recorded [facility] PIYUSH and Miroslavakristopher Butler however these episodes were isolated and not connected and patient without global signs and symptoms of PE and given the clinical scenario and that clinically significant at this time. Reviewed all findings both relevant and incidental with the patient at bedside. Pt verbalized understanding of findings, neccesary follow up, return to ED precautions. Pt agreed to review today's findings with their primary care provider. Pt non-toxic appearing upon discharge.        ED Course         Critical Care Time  Procedures

## 2024-09-16 ENCOUNTER — PROCEDURE VISIT (OUTPATIENT)
Dept: NEUROLOGY | Facility: CLINIC | Age: 64
End: 2024-09-16
Payer: MEDICARE

## 2024-09-16 DIAGNOSIS — M54.12 RADICULOPATHY, CERVICAL REGION: ICD-10-CM

## 2024-09-16 PROCEDURE — 95886 MUSC TEST DONE W/N TEST COMP: CPT | Performed by: PHYSICAL MEDICINE & REHABILITATION

## 2024-09-16 PROCEDURE — 95909 NRV CNDJ TST 5-6 STUDIES: CPT | Performed by: PHYSICAL MEDICINE & REHABILITATION

## 2024-09-23 ENCOUNTER — PATIENT MESSAGE (OUTPATIENT)
Dept: NEUROLOGY | Facility: CLINIC | Age: 64
End: 2024-09-23

## 2024-09-26 ENCOUNTER — HOSPITAL ENCOUNTER (OUTPATIENT)
Facility: HOSPITAL | Age: 64
Setting detail: OBSERVATION
Discharge: HOME | End: 2024-09-27
Attending: EMERGENCY MEDICINE | Admitting: INTERNAL MEDICINE
Payer: MEDICARE

## 2024-09-26 ENCOUNTER — APPOINTMENT (EMERGENCY)
Dept: CT IMAGING | Facility: HOSPITAL | Age: 64
End: 2024-09-26
Payer: MEDICARE

## 2024-09-26 DIAGNOSIS — R26.2 AMBULATORY DYSFUNCTION: ICD-10-CM

## 2024-09-26 DIAGNOSIS — R42 VERTIGO: Primary | ICD-10-CM

## 2024-09-26 PROBLEM — F41.9 ANXIETY: Status: ACTIVE | Noted: 2024-09-26

## 2024-09-26 PROBLEM — I10 HTN (HYPERTENSION): Status: ACTIVE | Noted: 2024-09-26

## 2024-09-26 PROBLEM — D72.829 LEUKOCYTOSIS: Status: ACTIVE | Noted: 2024-09-26

## 2024-09-26 LAB
ANION GAP SERPL CALCULATED.3IONS-SCNC: 6 MMOL/L (ref 4–13)
ATRIAL RATE: 65 BPM
BASOPHILS # BLD AUTO: 0.05 THOUSANDS/ΜL (ref 0–0.1)
BASOPHILS NFR BLD AUTO: 0 % (ref 0–1)
BUN SERPL-MCNC: 20 MG/DL (ref 5–25)
CALCIUM SERPL-MCNC: 9.5 MG/DL (ref 8.4–10.2)
CHLORIDE SERPL-SCNC: 105 MMOL/L (ref 96–108)
CO2 SERPL-SCNC: 27 MMOL/L (ref 21–32)
CREAT SERPL-MCNC: 0.92 MG/DL (ref 0.6–1.3)
EOSINOPHIL # BLD AUTO: 0.02 THOUSAND/ΜL (ref 0–0.61)
EOSINOPHIL NFR BLD AUTO: 0 % (ref 0–6)
ERYTHROCYTE [DISTWIDTH] IN BLOOD BY AUTOMATED COUNT: 11.9 % (ref 11.6–15.1)
GFR SERPL CREATININE-BSD FRML MDRD: 65 ML/MIN/1.73SQ M
GLUCOSE SERPL-MCNC: 96 MG/DL (ref 65–140)
HCT VFR BLD AUTO: 42.8 % (ref 34.8–46.1)
HGB BLD-MCNC: 14.3 G/DL (ref 11.5–15.4)
IMM GRANULOCYTES # BLD AUTO: 0.03 THOUSAND/UL (ref 0–0.2)
IMM GRANULOCYTES NFR BLD AUTO: 0 % (ref 0–2)
LYMPHOCYTES # BLD AUTO: 1.26 THOUSANDS/ΜL (ref 0.6–4.47)
LYMPHOCYTES NFR BLD AUTO: 11 % (ref 14–44)
MCH RBC QN AUTO: 30.4 PG (ref 26.8–34.3)
MCHC RBC AUTO-ENTMCNC: 33.4 G/DL (ref 31.4–37.4)
MCV RBC AUTO: 91 FL (ref 82–98)
MONOCYTES # BLD AUTO: 0.49 THOUSAND/ΜL (ref 0.17–1.22)
MONOCYTES NFR BLD AUTO: 4 % (ref 4–12)
NEUTROPHILS # BLD AUTO: 10.03 THOUSANDS/ΜL (ref 1.85–7.62)
NEUTS SEG NFR BLD AUTO: 85 % (ref 43–75)
NRBC BLD AUTO-RTO: 0 /100 WBCS
P AXIS: 68 DEGREES
PLATELET # BLD AUTO: 360 THOUSANDS/UL (ref 149–390)
PMV BLD AUTO: 9.8 FL (ref 8.9–12.7)
POTASSIUM SERPL-SCNC: 4.4 MMOL/L (ref 3.5–5.3)
PR INTERVAL: 144 MS
QRS AXIS: 52 DEGREES
QRSD INTERVAL: 86 MS
QT INTERVAL: 424 MS
QTC INTERVAL: 440 MS
RBC # BLD AUTO: 4.71 MILLION/UL (ref 3.81–5.12)
SODIUM SERPL-SCNC: 138 MMOL/L (ref 135–147)
T WAVE AXIS: 63 DEGREES
VENTRICULAR RATE: 65 BPM
WBC # BLD AUTO: 11.88 THOUSAND/UL (ref 4.31–10.16)

## 2024-09-26 PROCEDURE — 93005 ELECTROCARDIOGRAM TRACING: CPT

## 2024-09-26 PROCEDURE — 99223 1ST HOSP IP/OBS HIGH 75: CPT | Performed by: INTERNAL MEDICINE

## 2024-09-26 PROCEDURE — 70450 CT HEAD/BRAIN W/O DYE: CPT

## 2024-09-26 PROCEDURE — 96374 THER/PROPH/DIAG INJ IV PUSH: CPT

## 2024-09-26 PROCEDURE — 36415 COLL VENOUS BLD VENIPUNCTURE: CPT | Performed by: EMERGENCY MEDICINE

## 2024-09-26 PROCEDURE — 99284 EMERGENCY DEPT VISIT MOD MDM: CPT

## 2024-09-26 PROCEDURE — 93010 ELECTROCARDIOGRAM REPORT: CPT | Performed by: INTERNAL MEDICINE

## 2024-09-26 PROCEDURE — 99285 EMERGENCY DEPT VISIT HI MDM: CPT | Performed by: EMERGENCY MEDICINE

## 2024-09-26 PROCEDURE — 85025 COMPLETE CBC W/AUTO DIFF WBC: CPT | Performed by: EMERGENCY MEDICINE

## 2024-09-26 PROCEDURE — 80048 BASIC METABOLIC PNL TOTAL CA: CPT | Performed by: EMERGENCY MEDICINE

## 2024-09-26 PROCEDURE — 96375 TX/PRO/DX INJ NEW DRUG ADDON: CPT

## 2024-09-26 RX ORDER — SODIUM CHLORIDE 9 MG/ML
100 INJECTION, SOLUTION INTRAVENOUS CONTINUOUS
Status: DISCONTINUED | OUTPATIENT
Start: 2024-09-26 | End: 2024-09-26

## 2024-09-26 RX ORDER — ONDANSETRON 2 MG/ML
4 INJECTION INTRAMUSCULAR; INTRAVENOUS ONCE
Status: COMPLETED | OUTPATIENT
Start: 2024-09-26 | End: 2024-09-26

## 2024-09-26 RX ORDER — ONDANSETRON 2 MG/ML
4 INJECTION INTRAMUSCULAR; INTRAVENOUS EVERY 6 HOURS PRN
Status: DISCONTINUED | OUTPATIENT
Start: 2024-09-26 | End: 2024-09-26

## 2024-09-26 RX ORDER — FLUOXETINE 10 MG/1
10 CAPSULE ORAL DAILY
Status: DISCONTINUED | OUTPATIENT
Start: 2024-09-27 | End: 2024-09-27 | Stop reason: HOSPADM

## 2024-09-26 RX ORDER — DIAZEPAM 10 MG/2ML
2.5 INJECTION, SOLUTION INTRAMUSCULAR; INTRAVENOUS ONCE
Status: COMPLETED | OUTPATIENT
Start: 2024-09-26 | End: 2024-09-26

## 2024-09-26 RX ORDER — SODIUM CHLORIDE 9 MG/ML
100 INJECTION, SOLUTION INTRAVENOUS CONTINUOUS
Status: DISPENSED | OUTPATIENT
Start: 2024-09-26 | End: 2024-09-27

## 2024-09-26 RX ORDER — LOSARTAN POTASSIUM 25 MG/1
25 TABLET ORAL DAILY
Status: DISCONTINUED | OUTPATIENT
Start: 2024-09-27 | End: 2024-09-27 | Stop reason: HOSPADM

## 2024-09-26 RX ORDER — ACETAMINOPHEN 325 MG/1
650 TABLET ORAL EVERY 4 HOURS PRN
Status: DISCONTINUED | OUTPATIENT
Start: 2024-09-26 | End: 2024-09-27 | Stop reason: HOSPADM

## 2024-09-26 RX ORDER — CLONAZEPAM 0.5 MG/1
0.5 TABLET ORAL DAILY
Status: DISCONTINUED | OUTPATIENT
Start: 2024-09-27 | End: 2024-09-27 | Stop reason: HOSPADM

## 2024-09-26 RX ORDER — ONDANSETRON 4 MG/1
8 TABLET, ORALLY DISINTEGRATING ORAL ONCE
Status: COMPLETED | OUTPATIENT
Start: 2024-09-26 | End: 2024-09-26

## 2024-09-26 RX ORDER — MECLIZINE HCL 12.5 MG 12.5 MG/1
25 TABLET ORAL ONCE
Status: COMPLETED | OUTPATIENT
Start: 2024-09-26 | End: 2024-09-26

## 2024-09-26 RX ORDER — PROMETHAZINE HYDROCHLORIDE 25 MG/1
25 TABLET ORAL EVERY 6 HOURS PRN
Status: DISCONTINUED | OUTPATIENT
Start: 2024-09-26 | End: 2024-09-27 | Stop reason: HOSPADM

## 2024-09-26 RX ORDER — LOSARTAN POTASSIUM 25 MG/1
25 TABLET ORAL DAILY
COMMUNITY

## 2024-09-26 RX ADMIN — DIAZEPAM 2.5 MG: 10 INJECTION, SOLUTION INTRAMUSCULAR; INTRAVENOUS at 16:17

## 2024-09-26 RX ADMIN — ONDANSETRON 4 MG: 2 INJECTION INTRAMUSCULAR; INTRAVENOUS at 16:20

## 2024-09-26 RX ADMIN — ONDANSETRON 8 MG: 4 TABLET, ORALLY DISINTEGRATING ORAL at 13:45

## 2024-09-26 RX ADMIN — MECLIZINE HYDROCHLORIDE 25 MG: 12.5 TABLET ORAL at 17:07

## 2024-09-26 RX ADMIN — PROMETHAZINE HYDROCHLORIDE 25 MG: 25 TABLET ORAL at 20:24

## 2024-09-26 RX ADMIN — SODIUM CHLORIDE 100 ML/HR: 0.9 INJECTION, SOLUTION INTRAVENOUS at 20:37

## 2024-09-26 NOTE — ASSESSMENT & PLAN NOTE
Patient endorses room spinning sensation since the day prior to admission.  She states that her symptoms are exacerbated when she lifts her head up and turns her head  Patient states that she had a minor episode of this before however symptoms are very severe this episode  Patient does endorse nausea, vomiting, diaphoresis, chills  Patient received meclizine, Zofran, Valium in the ED with little resolution of her symptoms    Plan  IV fluid 100 mL/h for 1 L  Phenergan 25 mg   If Phenergan does not work consider Benadryl, or benzodiazepine.   Vestibular rehab outpatient

## 2024-09-26 NOTE — Clinical Note
Case was discussed with   and the patient's admission status was agreed to be Admission Status: observation status to the service of   .

## 2024-09-26 NOTE — ED CARE HANDOFF
Emergency Department Sign Out Note        Sign out and transfer of care from Dr. Maradiaga. See Separate Emergency Department note.     The patient, Flor Black, was evaluated by the previous provider for vertigo.    Workup Completed:  Labs    ED Course / Workup Pending (followup):  Pending CT                                  ED Course as of 09/26/24 1831   Thu Sep 26, 2024   1715 S/O: Intractable peripheral vertigo with associated ambulatory dysfunction. Pending head CT and admission to medicine.   1823 CT head shows no acute abnormalities.     Procedures  Medical Decision Making  Amount and/or Complexity of Data Reviewed  Labs: ordered.  Radiology: ordered.    Risk  Prescription drug management.  Decision regarding hospitalization.            Disposition  Final diagnoses:   Vertigo   Ambulatory dysfunction     Time reflects when diagnosis was documented in both MDM as applicable and the Disposition within this note       Time User Action Codes Description Comment    9/26/2024  6:29 PM Yaron Freitas [R42] Vertigo     9/26/2024  6:29 PM Yaron Freitas Add [R26.2] Ambulatory dysfunction           ED Disposition       ED Disposition   Admit    Condition   Stable    Date/Time   Thu Sep 26, 2024  6:30 PM    Comment   Case was discussed with Dr. Fregoso and the patient's admission status was agreed to be Admission Status: observation status to the service of Dr. Fregoso.               Follow-up Information    None       Patient's Medications   Discharge Prescriptions    No medications on file     No discharge procedures on file.       ED Provider  Electronically Signed by     Yaron Freitas MD  09/26/24 1831

## 2024-09-26 NOTE — ED PROCEDURE NOTE
PROCEDURE  ECG 12 Lead Documentation Only    Date/Time: 9/26/2024 4:43 PM    Performed by: Janes Maradiaga MD  Authorized by: Janes Maradiaga MD    Indications / Diagnosis:  Dizziness  ECG reviewed by me, the ED Provider: yes    Patient location:  ED  Previous ECG:     Previous ECG:  Unavailable    Comparison to cardiac monitor: Yes    Interpretation:     Interpretation: non-specific    Rate:     ECG rate:  65    ECG rate assessment: normal    Rhythm:     Rhythm: sinus rhythm    Ectopy:     Ectopy: none    QRS:     QRS axis:  Normal    QRS intervals:  Normal  Conduction:     Conduction: abnormal      Abnormal conduction: incomplete RBBB    ST segments:     ST segments:  Normal  T waves:     T waves: flattening      Flattening:  AVL, V1 and V2  Q waves:     Q waves:  V1  Other findings:     Other findings: U wave    Comments:      No ecg signs of ischemia/ injury        Janes Maradiaga MD  09/26/24 7325

## 2024-09-26 NOTE — ED PROVIDER NOTES
Final diagnoses:   None     ED Disposition       None          Assessment & Plan       Medical Decision Making  Ms. Ornelas is a 63-year-old female presenting to the ED for an acute episode of vertigo.  She was given some Zofran for her nausea. Some slight left sided nystagmus was noted- Epley maneuver attempted with no significant symptomology elicited during maneuver.  Workup only significant for mildly elevated WBC.  We also trialed Valium with no improvement in symptomology.  We we will obtain a CT head in order to evaluate for central causes of nystagmus and also trial meclizine.  We will consider patient under observation as patient continues to be unable to change position at all without extreme vertigo; she is completely unable to ambulate at this time.    Amount and/or Complexity of Data Reviewed  Labs: ordered.  Radiology: ordered.    Risk  Prescription drug management.             Medications   ondansetron (ZOFRAN-ODT) dispersible tablet 8 mg (8 mg Oral Given 24 1345)       ED Risk Strat Scores                                               History of Present Illness       Chief Complaint   Patient presents with    Dizziness     Pt presents with dizziness, slight frontal headache that began yesterday. States she began getting nauseated with movement this am. Denies CP/SOB       Past Medical History:   Diagnosis Date    Anxiety     Panic attacks     Panic attacks       Past Surgical History:   Procedure Laterality Date    APPENDECTOMY       SECTION      COLONOSCOPY      [place] COLONOSCOPY FLX DX W/COLLJ SPEC WHEN PFRMD N/A 2017    Procedure: COLONOSCOPY;  Surgeon: Xavi Delong III, MD;  Location: [facility];  Service: Gastroenterology      History reviewed. No pertinent family history.   Social History     Tobacco Use    Smoking status: Former     Current packs/day: 0.00     Types: Cigarettes     Quit date:      Years since quittin.7    Smokeless tobacco: Never   Substance Use  Topics    Alcohol use: Yes     Comment: OCCASIONAL    Drug use: Never      E-Cigarette/Vaping      E-Cigarette/Vaping Substances    Nicotine No     THC No     CBD No     Flavoring No     Other No     Unknown No       I have reviewed and agree with the history as documented.     Ms. Ornelas is a 63-year-old female presenting to the ED for an acute episode of vertigo.  She first started noticing symptoms yesterday while driving and turning her head side-to-side to check for a blind spot. Symptoms were temporary until last night- any change in position but about feelings of nausea followed by vomiting and extreme dizzy/lightheadedness.  Symptoms continued into today she was unable to ambulate with confidence due to feelings of the room spinning.  She also continued to experience nausea/vomiting.  She notes that she has had little to p.o. intake in the last 24 hours. Of note she was recently concerned about having a UTI due to creased urge frequency.  However, evaluation at urgent care showed no UTI on urinalysis and symptoms were attributed to a potential kidney stone.       Dizziness  Associated symptoms: headaches    Associated symptoms: no chest pain, no palpitations and no weakness        Review of Systems   HENT: Negative.     Eyes: Negative.    Respiratory: Negative.     Cardiovascular:  Negative for chest pain, palpitations and leg swelling.   Genitourinary:  Positive for frequency and urgency.   Skin: Negative.    Neurological:  Positive for dizziness, light-headedness and headaches. Negative for tremors, seizures, syncope, facial asymmetry, speech difficulty, weakness and numbness.           Objective       ED Triage Vitals [09/26/24 1239]   Temperature Pulse Blood Pressure Respirations SpO2 Patient Position - Orthostatic VS   97.6 °F (36.4 °C) 73 (!) 188/86 22 100 % Lying      Temp Source Heart Rate Source BP Location FiO2 (%) Pain Score    Oral Monitor Left arm -- No Pain      Vitals      Date and Time Temp  Pulse SpO2 Resp BP Pain Score FACES Pain Rating User   09/26/24 1239 97.6 °F (36.4 °C) 73 100 % 22 188/86 No Pain -- AW            Physical Exam  Vitals reviewed.   Constitutional:       General: She is in acute distress.      Appearance: She is normal weight. She is not ill-appearing, toxic-appearing or diaphoretic.   HENT:      Head: Normocephalic and atraumatic.      Nose: No congestion or rhinorrhea.      Mouth/Throat:      Mouth: Mucous membranes are moist.      Pharynx: No oropharyngeal exudate or posterior oropharyngeal erythema.   Eyes:      General: No scleral icterus.        Right eye: No discharge.         Left eye: No discharge.      Extraocular Movements: Extraocular movements intact.      Conjunctiva/sclera: Conjunctivae normal.   Cardiovascular:      Rate and Rhythm: Normal rate and regular rhythm.      Pulses: Normal pulses.      Heart sounds: Normal heart sounds. No murmur heard.     No friction rub. No gallop.   Pulmonary:      Effort: Pulmonary effort is normal. No respiratory distress.      Breath sounds: No stridor. No wheezing, rhonchi or rales.   Chest:      Chest wall: No tenderness.   Abdominal:      General: Abdomen is flat. Bowel sounds are normal. There is no distension.      Palpations: Abdomen is soft. There is no mass.      Tenderness: There is no abdominal tenderness. There is no guarding or rebound.      Hernia: No hernia is present.   Musculoskeletal:         General: No swelling, tenderness, deformity or signs of injury. Normal range of motion.      Right lower leg: No edema.      Left lower leg: No edema.   Skin:     General: Skin is warm.      Coloration: Skin is not jaundiced or pale.      Findings: No bruising, erythema, lesion or rash.   Neurological:      General: No focal deficit present.      Mental Status: She is alert and oriented to person, place, and time. Mental status is at baseline.      Comments: Left-sided nystagmus   Psychiatric:         Mood and Affect: Mood  normal.         Behavior: Behavior normal.         Thought Content: Thought content normal.         Judgment: Judgment normal.         Results Reviewed       None            No orders to display       Procedures    ED Medication and Procedure Management   Prior to Admission Medications   Prescriptions Last Dose Informant Patient Reported? Taking?   FLUoxetine (PROzac) 10 MG tablet   Yes No   Sig: Take 10 mg by mouth daily   clonazePAM (KlonoPIN) 0.5 mg tablet   Yes No   Sig: Take 0.5 mg by mouth daily      Facility-Administered Medications: None     Patient's Medications   Discharge Prescriptions    No medications on file     No discharge procedures on file.  ED SEPSIS DOCUMENTATION            Irasema Lee MD  09/26/24 4795

## 2024-09-26 NOTE — ASSESSMENT & PLAN NOTE
Patient had leukocytosis to 11.8 on admission  Most likely in the setting of stress and anxiety    Plan  AM CBC

## 2024-09-26 NOTE — H&P
H&P - Hospitalist   Name: Flor Black 63 y.o. female I MRN: [id number]  Unit/Bed#: S -01 I Date of Admission: 9/26/2024   Date of Service: 9/26/2024 I Hospital Day: 0     Assessment & Plan  Vertigo  Patient endorses room spinning sensation since the day prior to admission.  She states that her symptoms are exacerbated when she lifts her head up and turns her head  Patient states that she had a minor episode of this before however symptoms are very severe this episode  Patient does endorse nausea, vomiting, diaphoresis, chills  Patient received meclizine, Zofran, Valium in the ED with little resolution of her symptoms    Plan  IV fluid 100 mL/h for 1 L  Phenergan 25 mg   If Phenergan does not work consider Benadryl, or benzodiazepine.   Vestibular rehab outpatient  Leukocytosis  Patient had leukocytosis to 11.8 on admission  Most likely in the setting of stress and anxiety    Plan  AM CBC  HTN (hypertension)  Patient takes losartan 25 mg at home    Plan  Continue home regimen  Anxiety  Patient takes Prozac and Klonopin at home    Plan  Continue home regimen    VTE Pharmacologic Prophylaxis: VTE Score: 2 Low Risk (Score 0-2) - Encourage Ambulation.  Code Status: Level 1 - Full Code   Discussion with family: Updated  ( and son) at bedside.    Anticipated Length of Stay: Patient will be admitted on an observation basis with an anticipated length of stay of less than 2 midnights secondary to vertigo.    History of Present Illness   Chief Complaint: Dizziness, nausea    Flor Black is a 63 y.o. female with a PMH of hypertension, anxiety who presents with intractable dizziness.  Patient states that at 10 AM the day before admission patient was driving and looked left and right and experienced a lightheaded sensation.  Patient states that the sensation passed and she continued to her hair salon where she had her hair washed and after sitting up again experienced lightheadedness and  "described a sensation of feeling unsteady.  Patient describes it as \"feeling like I am on a boat\".  After the patient arrived home she laid on the couch and after getting up to use the restroom she had very severe room spinning sensation.  She endorses diaphoresis, chills, vomiting during this.  Patient states that she was unable to stand up and walk around her house which prompted her to come to the emergency department.  Patient also notes that her  has had an episode of vertigo in the past and her middle son experiences recurrent vertigo.  Patient denies recent illnesses or sick contacts. She notes that she has had little p.o. intake in the last 24 hours.     Review of Systems   Constitutional:  Positive for appetite change, chills and diaphoresis. Negative for fatigue, fever and unexpected weight change.   HENT:  Negative for congestion, rhinorrhea, sneezing and sore throat.    Eyes: Negative.    Respiratory:  Negative for cough, shortness of breath and wheezing.    Cardiovascular:  Negative for chest pain, palpitations and leg swelling.   Gastrointestinal:  Positive for nausea and vomiting. Negative for abdominal pain, constipation and diarrhea.   Endocrine: Negative.    Genitourinary:  Negative for difficulty urinating, dysuria, frequency, hematuria and urgency.   Musculoskeletal: Negative.    Skin:  Negative for color change and wound.   Neurological:  Positive for dizziness. Negative for syncope, weakness, light-headedness and numbness.   Hematological: Negative.    Psychiatric/Behavioral: Negative.         I have reviewed the patient's PMH, PSH, Social History, Family History, Meds, and Allergies  Historical Information   Past Medical History:   Diagnosis Date    Anxiety     Panic attacks     Panic attacks      Past Surgical History:   Procedure Laterality Date    APPENDECTOMY       SECTION      COLONOSCOPY      [place] COLONOSCOPY FLX DX W/COLLJ SPEC WHEN PFRMD N/A 2017    Procedure: " COLONOSCOPY;  Surgeon: Xavi Delong III, MD;  Location: [facility];  Service: Gastroenterology     Social History     Tobacco Use    Smoking status: Former     Current packs/day: 0.00     Types: Cigarettes     Quit date:      Years since quittin.7    Smokeless tobacco: Never   Vaping Use    Vaping status: Not on file   Substance and Sexual Activity    Alcohol use: Yes     Comment: OCCASIONAL    Drug use: Never    Sexual activity: Not on file     E-Cigarette/Vaping     E-Cigarette/Vaping Substances    Nicotine No     THC No     CBD No     Flavoring No     Other No     Unknown No      History reviewed. No pertinent family history.    Current Facility-Administered Medications:     acetaminophen (TYLENOL) tablet 650 mg, Q4H PRN    [START ON 2024] clonazePAM (KlonoPIN) tablet 0.5 mg, Daily    [START ON 2024] FLUoxetine (PROzac) capsule 10 mg, Daily    [START ON 2024] losartan (COZAAR) tablet 25 mg, Daily    promethazine (PHENERGAN) tablet 25 mg, Q6H PRN    sodium chloride 0.9 % infusion, Continuous  Prior to Admission Medications   Prescriptions Last Dose Informant Patient Reported? Taking?   FLUoxetine (PROzac) 10 MG tablet 2024  Yes Yes   Sig: Take 10 mg by mouth daily   clonazePAM (KlonoPIN) 0.5 mg tablet 2024  Yes Yes   Sig: Take 0.5 mg by mouth daily   losartan (COZAAR) 25 mg tablet 2024  Yes Yes   Sig: Take 25 mg by mouth daily      Facility-Administered Medications: None     Patient has no known allergies.  Social History:  Marital Status: /Civil Union   Occupation: Retired  Patient Pre-hospital Living Situation: Home  Patient Pre-hospital Level of Mobility: walks  Patient Pre-hospital Diet Restrictions: None    Objective     Vitals:   Blood Pressure: 143/68 (24 1830)  Pulse: 62 (24 1845)  Temperature: 97.6 °F (36.4 °C) (24 1239)  Temp Source: Oral (24 1239)  Respirations: 22 (24 1239)  SpO2: 97 % (24 1845)    Physical  Exam  Vitals reviewed.   Constitutional:       General: She is in acute distress.      Appearance: She is normal weight. She is not ill-appearing, toxic-appearing or diaphoretic.   HENT:      Head: Normocephalic and atraumatic.      Nose: No congestion or rhinorrhea.      Mouth/Throat:      Mouth: Mucous membranes are moist.      Pharynx: No oropharyngeal exudate or posterior oropharyngeal erythema.   Eyes:      General: No scleral icterus.        Right eye: No discharge.         Left eye: No discharge.      Extraocular Movements: Extraocular movements intact.      Conjunctiva/sclera: Conjunctivae normal.   Cardiovascular:      Rate and Rhythm: Normal rate and regular rhythm.      Pulses: Normal pulses.      Heart sounds: Normal heart sounds. No murmur heard.     No friction rub. No gallop.   Pulmonary:      Effort: Pulmonary effort is normal. No respiratory distress.      Breath sounds: No stridor. No wheezing, rhonchi or rales.   Chest:      Chest wall: No tenderness.   Abdominal:      General: Abdomen is flat. Bowel sounds are normal. There is no distension.      Palpations: Abdomen is soft. There is no mass.      Tenderness: There is no abdominal tenderness. There is no guarding or rebound.      Hernia: No hernia is present.   Musculoskeletal:         General: No swelling, tenderness, deformity or signs of injury. Normal range of motion.      Right lower leg: No edema.      Left lower leg: No edema.   Skin:     General: Skin is warm.      Coloration: Skin is not jaundiced or pale.      Findings: No bruising, erythema, lesion or rash.   Neurological:      General: No focal deficit present.      Mental Status: She is alert and oriented to person, place, and time. Mental status is at baseline.      Comments: Left-sided nystagmus   Psychiatric:         Mood and Affect: Mood normal.         Behavior: Behavior normal.         Thought Content: Thought content normal.         Judgment: Judgment  normal.    Lines/Drains:  Lines/Drains/Airways       Active Status       None                        Additional Data:   Lab Results: I have reviewed the following results: CBC/BMP:   .     09/26/24  1615   WBC 11.88*   HGB 14.3   HCT 42.8      SODIUM 138   K 4.4      CO2 27   BUN 20   CREATININE 0.92   GLUC 96      Results from last 7 days   Lab Units 09/26/24  1615   WBC Thousand/uL 11.88*   HEMOGLOBIN g/dL 14.3   HEMATOCRIT % 42.8   PLATELETS Thousands/uL 360   SEGS PCT % 85*   LYMPHO PCT % 11*   MONO PCT % 4   EOS PCT % 0     Results from last 7 days   Lab Units 09/26/24  1615   SODIUM mmol/L 138   POTASSIUM mmol/L 4.4   CHLORIDE mmol/L 105   CO2 mmol/L 27   BUN mg/dL 20   CREATININE mg/dL 0.92   ANION GAP mmol/L 6   CALCIUM mg/dL 9.5   GLUCOSE RANDOM mg/dL 96             Lab Results   Component Value Date    HGBA1C 5.3 07/03/2024           Imaging Review: Reviewed radiology reports from this admission including: CT head.  Other Studies: EKG was reviewed.     Administrative Statements   I have spent a total time of 30 minutes in caring for this patient on the day of the visit/encounter.    ** Please Note: This note has been constructed using a voice recognition system. **

## 2024-09-27 VITALS
TEMPERATURE: 98.8 F | RESPIRATION RATE: 16 BRPM | SYSTOLIC BLOOD PRESSURE: 121 MMHG | OXYGEN SATURATION: 97 % | DIASTOLIC BLOOD PRESSURE: 83 MMHG | HEART RATE: 65 BPM

## 2024-09-27 LAB
ANION GAP SERPL CALCULATED.3IONS-SCNC: 6 MMOL/L (ref 4–13)
BASOPHILS # BLD AUTO: 0.07 THOUSANDS/ΜL (ref 0–0.1)
BASOPHILS NFR BLD AUTO: 1 % (ref 0–1)
BUN SERPL-MCNC: 17 MG/DL (ref 5–25)
CALCIUM SERPL-MCNC: 8.9 MG/DL (ref 8.4–10.2)
CHLORIDE SERPL-SCNC: 109 MMOL/L (ref 96–108)
CO2 SERPL-SCNC: 25 MMOL/L (ref 21–32)
CREAT SERPL-MCNC: 0.92 MG/DL (ref 0.6–1.3)
EOSINOPHIL # BLD AUTO: 0.08 THOUSAND/ΜL (ref 0–0.61)
EOSINOPHIL NFR BLD AUTO: 1 % (ref 0–6)
ERYTHROCYTE [DISTWIDTH] IN BLOOD BY AUTOMATED COUNT: 12.1 % (ref 11.6–15.1)
GFR SERPL CREATININE-BSD FRML MDRD: 65 ML/MIN/1.73SQ M
GLUCOSE SERPL-MCNC: 85 MG/DL (ref 65–140)
HCT VFR BLD AUTO: 40.8 % (ref 34.8–46.1)
HGB BLD-MCNC: 13.6 G/DL (ref 11.5–15.4)
IMM GRANULOCYTES # BLD AUTO: 0.02 THOUSAND/UL (ref 0–0.2)
IMM GRANULOCYTES NFR BLD AUTO: 0 % (ref 0–2)
LYMPHOCYTES # BLD AUTO: 2.13 THOUSANDS/ΜL (ref 0.6–4.47)
LYMPHOCYTES NFR BLD AUTO: 29 % (ref 14–44)
MCH RBC QN AUTO: 30.8 PG (ref 26.8–34.3)
MCHC RBC AUTO-ENTMCNC: 33.3 G/DL (ref 31.4–37.4)
MCV RBC AUTO: 92 FL (ref 82–98)
MONOCYTES # BLD AUTO: 0.59 THOUSAND/ΜL (ref 0.17–1.22)
MONOCYTES NFR BLD AUTO: 8 % (ref 4–12)
NEUTROPHILS # BLD AUTO: 4.52 THOUSANDS/ΜL (ref 1.85–7.62)
NEUTS SEG NFR BLD AUTO: 61 % (ref 43–75)
NRBC BLD AUTO-RTO: 0 /100 WBCS
PLATELET # BLD AUTO: 311 THOUSANDS/UL (ref 149–390)
PMV BLD AUTO: 9.7 FL (ref 8.9–12.7)
POTASSIUM SERPL-SCNC: 3.9 MMOL/L (ref 3.5–5.3)
RBC # BLD AUTO: 4.42 MILLION/UL (ref 3.81–5.12)
SODIUM SERPL-SCNC: 140 MMOL/L (ref 135–147)
WBC # BLD AUTO: 7.41 THOUSAND/UL (ref 4.31–10.16)

## 2024-09-27 PROCEDURE — 85025 COMPLETE CBC W/AUTO DIFF WBC: CPT | Performed by: INTERNAL MEDICINE

## 2024-09-27 PROCEDURE — 99238 HOSP IP/OBS DSCHRG MGMT 30/<: CPT | Performed by: HOSPITALIST

## 2024-09-27 PROCEDURE — 80048 BASIC METABOLIC PNL TOTAL CA: CPT | Performed by: INTERNAL MEDICINE

## 2024-09-27 RX ORDER — MECLIZINE HYDROCHLORIDE 25 MG/1
25 TABLET ORAL EVERY 8 HOURS SCHEDULED
Status: DISCONTINUED | OUTPATIENT
Start: 2024-09-27 | End: 2024-09-27 | Stop reason: HOSPADM

## 2024-09-27 RX ORDER — MECLIZINE HYDROCHLORIDE 25 MG/1
25 TABLET ORAL EVERY 8 HOURS SCHEDULED
Qty: 30 TABLET | Refills: 0 | Status: SHIPPED | OUTPATIENT
Start: 2024-09-27

## 2024-09-27 RX ORDER — PROMETHAZINE HYDROCHLORIDE 25 MG/1
25 TABLET ORAL EVERY 6 HOURS PRN
Qty: 30 TABLET | Refills: 0 | Status: SHIPPED | OUTPATIENT
Start: 2024-09-27

## 2024-09-27 RX ADMIN — LOSARTAN POTASSIUM 25 MG: 25 TABLET, FILM COATED ORAL at 09:12

## 2024-09-27 RX ADMIN — MECLIZINE HYDROCHLORIDE 25 MG: 25 TABLET ORAL at 13:31

## 2024-09-27 RX ADMIN — CLONAZEPAM 0.5 MG: 0.5 TABLET ORAL at 09:12

## 2024-09-27 RX ADMIN — FLUOXETINE 10 MG: 10 CAPSULE ORAL at 09:12

## 2024-09-27 NOTE — PLAN OF CARE
Problem: PAIN - ADULT  Goal: Verbalizes/displays adequate comfort level or baseline comfort level  Description: Interventions:  - Encourage patient to monitor pain and request assistance  - Assess pain using appropriate pain scale  - Administer analgesics based on type and severity of pain and evaluate response  - Implement non-pharmacological measures as appropriate and evaluate response  - Consider cultural and social influences on pain and pain management  - Notify physician/advanced practitioner if interventions unsuccessful or patient reports new pain  Outcome: Progressing     Problem: INFECTION - ADULT  Goal: Absence or prevention of progression during hospitalization  Description: INTERVENTIONS:  - Assess and monitor for signs and symptoms of infection  - Monitor lab/diagnostic results  - Monitor all insertion sites, i.e. indwelling lines, tubes, and drains  - Monitor endotracheal if appropriate and nasal secretions for changes in amount and color  - [place] appropriate cooling/warming therapies per order  - Administer medications as ordered  - Instruct and encourage patient and family to use good hand hygiene technique  - Identify and instruct in appropriate isolation precautions for identified infection/condition  Outcome: Progressing  Goal: Absence of fever/infection during neutropenic period  Description: INTERVENTIONS:  - Monitor WBC    Outcome: Progressing     Problem: SAFETY ADULT  Goal: Patient will remain free of falls  Description: INTERVENTIONS:  - Educate patient/family on patient safety including physical limitations  - Instruct patient to call for assistance with activity   - Consult OT/PT to assist with strengthening/mobility   - Keep Call bell within reach  - Keep bed low and locked with side rails adjusted as appropriate  - Keep care items and personal belongings within reach  - Initiate and maintain comfort rounds  - Make Fall Risk Sign visible to staff  - Apply yellow socks and bracelet  for high fall risk patients  - Consider moving patient to room near nurses station  Outcome: Progressing  Goal: Maintain or return to baseline ADL function  Description: INTERVENTIONS:  -  Assess patient's ability to carry out ADLs; assess patient's baseline for ADL function and identify physical deficits which impact ability to perform ADLs (bathing, care of mouth/teeth, toileting, grooming, dressing, etc.)  - Assess/evaluate cause of self-care deficits   - Assess range of motion  - Assess patient's mobility; develop plan if impaired  - Assess patient's need for assistive devices and provide as appropriate  - Encourage maximum independence but intervene and supervise when necessary  - Involve family in performance of ADLs  - Assess for home care needs following discharge   - Consider OT consult to assist with ADL evaluation and planning for discharge  - Provide patient education as appropriate  Outcome: Progressing  Goal: Maintains/Returns to pre admission functional level  Description: INTERVENTIONS:  - Perform AM-PAC 6 Click Basic Mobility/ Daily Activity assessment daily.  - Set and communicate daily mobility goal to care team and patient/family/caregiver.   - Collaborate with rehabilitation services on mobility goals if consulted  - - Out of bed for toileting  - Record patient progress and toleration of activity level   Outcome: Progressing     Problem: DISCHARGE PLANNING  Goal: Discharge to home or other facility with appropriate resources  Description: INTERVENTIONS:  - Identify barriers to discharge w/patient and caregiver  - Arrange for needed discharge resources and transportation as appropriate  - Identify discharge learning needs (meds, wound care, etc.)  - Arrange for interpretive services to assist at discharge as needed  - Refer to Case Management Department for coordinating discharge planning if the patient needs post-hospital services based on physician/advanced practitioner order or complex needs  related to functional status, cognitive ability, or social support system  Outcome: Progressing

## 2024-09-27 NOTE — ASSESSMENT & PLAN NOTE
Patient endorses room spinning sensation since the day prior to admission.  She states that her symptoms are exacerbated when she lifts her head up and turns her head  Patient states that she had a minor episode of this before however symptoms are very severe this episode  Patient does endorse nausea, vomiting, diaphoresis, chills  Patient received meclizine, Zofran, Valium in the ED with little resolution of her symptoms    Plan  Meclizine  Phenergan  Vestibular rehab outpatient

## 2024-09-27 NOTE — DISCHARGE SUMMARY
Discharge Summary - Hospitalist   Name: Flor Black 63 y.o. female I MRN: [id number]  Unit/Bed#: S -01 I Date of Admission: 9/26/2024   Date of Service: 9/27/2024 I Hospital Day: 0     Assessment & Plan  Vertigo  Patient endorses room spinning sensation since the day prior to admission.  She states that her symptoms are exacerbated when she lifts her head up and turns her head  Patient states that she had a minor episode of this before however symptoms are very severe this episode  Patient does endorse nausea, vomiting, diaphoresis, chills  Patient received meclizine, Zofran, Valium in the ED with little resolution of her symptoms    Plan  Meclizine  Phenergan  Vestibular rehab outpatient  Leukocytosis  Patient had leukocytosis to 11.8 on admission  Most likely in the setting of stress and anxiety    Plan  AM CBC  HTN (hypertension)  Patient takes losartan 25 mg at home    Plan  Continue home regimen  Anxiety  Patient takes Prozac and Klonopin at home    Plan  Continue home regimen     Medical Problems       Resolved Problems  Date Reviewed: 7/19/2017   None       Discharging Physician / Practitioner: Chriss Mcneal MD  PCP: Diaz Brito MD  Admission Date:   Admission Orders (From admission, onward)       Ordered        09/26/24 1831  Place in Observation  Once                          Discharge Date: 09/27/24    Consultations During Hospital Stay:  None    Procedures Performed:   CT head    Significant Findings / Test Results:   CT head without contrast  Result Date: 9/26/2024  Impression: No acute intracranial abnormality. Workstation performed: [id number]   No Chest XR results available for this patient.       Incidental Findings:   None     Test Results Pending at Discharge (will require follow up):   None     Outpatient Tests Requested:  None    Complications: None    Reason for Admission: Vertigo    Hospital Course:   Flor Black is a 63 y.o. female patient who originally presented to the  hospital on 9/26/2024 due to vertigo and nausea.  Patient was given Zofran, Valium, meclizine with very minor improvement in her symptoms.  Patient was transition to Phenergan which improved her nausea markedly.  Patient continued to endorse severe dizziness upon standing.  Patient stated that she was comfortable going home as medical management the hospital would be the same as at home.  Patient was discharged with Phenergan and meclizine with ambulatory referral to vestibular rehab.    Condition at Discharge: stable    Discharge Day Visit / Exam:   Subjective: No acute events overnight.  Patient states that she still has severe dizziness when standing up.  She endorses no nausea which she attributes to Phenergan.  Epinephrine was performed with no improvement in symptoms.  Hints exam less concerning for central BPPV  Vitals: Blood Pressure: 121/83 (09/27/24 1505)  Pulse: 65 (09/27/24 1505)  Temperature: 98.8 °F (37.1 °C) (09/27/24 1505)  Temp Source: Oral (09/26/24 1239)  Respirations: 16 (09/27/24 1505)  SpO2: 97 % (09/27/24 1505)  Exam:   Physical Exam  Constitutional:       General: She is not in acute distress.     Appearance: Normal appearance.   HENT:      Head: Normocephalic and atraumatic.      Nose: Nose normal.      Mouth/Throat:      Mouth: Mucous membranes are moist.      Pharynx: Oropharynx is clear.   Eyes:      Extraocular Movements: Extraocular movements intact.      Conjunctiva/sclera: Conjunctivae normal.      Pupils: Pupils are equal, round, and reactive to light.   Cardiovascular:      Rate and Rhythm: Normal rate and regular rhythm.      Heart sounds: Normal heart sounds. No murmur heard.  Pulmonary:      Effort: Pulmonary effort is normal.      Breath sounds: Normal breath sounds. No wheezing, rhonchi or rales.   Abdominal:      General: Abdomen is flat. Bowel sounds are normal. There is no distension.      Palpations: Abdomen is soft.      Tenderness: There is no abdominal tenderness.    Musculoskeletal:         General: No swelling or tenderness.      Right lower leg: No edema.      Left lower leg: No edema.   Skin:     General: Skin is warm and dry.   Neurological:      General: No focal deficit present.      Mental Status: She is alert and oriented to person, place, and time.   Psychiatric:         Mood and Affect: Mood normal.         Behavior: Behavior normal.          Discussion with Family: Updated  () at bedside.    Discharge instructions/Information to patient and family:   See after visit summary for information provided to patient and family.      Provisions for Follow-Up Care:  See after visit summary for information related to follow-up care and any pertinent home health orders.      Mobility at time of Discharge:   JH-HLM Achieved: 6: Walk 10 steps or more  HLM Goal achieved. Continue to encourage appropriate mobility.     Disposition:   Home    Planned Readmission: None    Discharge Medications:  See after visit summary for reconciled discharge medications provided to patient and/or family.      Administrative Statements   Discharge Statement:  I have spent a total time of 30 minutes in caring for this patient on the day of the visit/encounter.    **Please Note: This note may have been constructed using a voice recognition system**

## 2024-09-27 NOTE — ED ATTENDING ATTESTATION
9/26/2024  Janes JACKSON MD, saw and evaluated the patient. I have discussed the patient with the resident/non-physician practitioner and agree with the resident's/non-physician practitioner's findings, Plan of Care, and MDM as documented in the resident's/non-physician practitioner's note, except where noted. All available labs and Radiology studies were reviewed.  I was present for key portions of any procedure(s) performed by the resident/non-physician practitioner and I was immediately available to provide assistance.       At this point I agree with the current assessment done in the Emergency Department.  I have conducted an independent evaluation of this patient a history and physical is as follows:see h and p above     ED Course  ED Course as of 09/27/24 1833   Thu Sep 26, 2024   1646 Er md note-  pt seen and thoroughly evaluated by er md- case d/with  resident - pt re-eval by er md- 63 yr female with intermitent  sense of external motion with nausea/ and some non bloody vomitus- initially was triggered by side to side head motion in car- but resolved- then yesterday went to [employer]  and upon gettign up with  acute sense of external motion   --  resolved with lying still-- since thsis am with any motion of anykind  with acute sense of external motion with n/v-- --pt attempted to take her am meds today and vomiting them up --  no head/neck pain/ new meds/ recent illness tinnitus / no diplopia/ dysarthria/ dysphagia/ dysphonia/ dysmetria- avss- htnsive- which resolved in er- pt lying on left side -- rrr s1/s2-cta-b/soft nt/nd- equal bilateral radial/dp pulses- no ble edema/calf tenderness/asym/ erythema- normal non focal neuro exam- mild fatiguable left sided horizontal nystagmus-  nystagmus- neg test of sckew- normal finger to nose/heel to shin all bilaterally    1657 Er md procedure note - darby [place] pike to both sides- no symptoms -- given brief left sided horizontal nystagmus- pt then into an  apply maneuver with pt lying on right for 2 minutes--    1700 Er md medical decision making note- after h and p- er md clinical suspicion of any central /cns cause of vertigo is low          Critical Care Time  Procedures

## 2024-09-27 NOTE — PLAN OF CARE
Problem: PAIN - ADULT  Goal: Verbalizes/displays adequate comfort level or baseline comfort level  Description: Interventions:  - Encourage patient to monitor pain and request assistance  - Assess pain using appropriate pain scale  - Administer analgesics based on type and severity of pain and evaluate response  - Implement non-pharmacological measures as appropriate and evaluate response  - Consider cultural and social influences on pain and pain management  - Notify physician/advanced practitioner if interventions unsuccessful or patient reports new pain  Outcome: Adequate for Discharge     Problem: INFECTION - ADULT  Goal: Absence or prevention of progression during hospitalization  Description: INTERVENTIONS:  - Assess and monitor for signs and symptoms of infection  - Monitor lab/diagnostic results  - Monitor all insertion sites, i.e. indwelling lines, tubes, and drains  - Monitor endotracheal if appropriate and nasal secretions for changes in amount and color  - [place] appropriate cooling/warming therapies per order  - Administer medications as ordered  - Instruct and encourage patient and family to use good hand hygiene technique  - Identify and instruct in appropriate isolation precautions for identified infection/condition  Outcome: Adequate for Discharge  Goal: Absence of fever/infection during neutropenic period  Description: INTERVENTIONS:  - Monitor WBC    Outcome: Adequate for Discharge     Problem: SAFETY ADULT  Goal: Patient will remain free of falls  Description: INTERVENTIONS:  - Educate patient/family on patient safety including physical limitations  - Instruct patient to call for assistance with activity   - Consult OT/PT to assist with strengthening/mobility   - Keep Call bell within reach  - Keep bed low and locked with side rails adjusted as appropriate  - Keep care items and personal belongings within reach  - Initiate and maintain comfort rounds  - Make Fall Risk Sign visible to  staff  - Offer Toileting every 2 Hours, in advance of need  - Initiate/Maintain bed alarm  - Obtain necessary fall risk management equipment: bed alarm  - Apply yellow socks and bracelet for high fall risk patients  - Consider moving patient to room near nurses station  Outcome: Adequate for Discharge  Goal: Maintain or return to baseline ADL function  Description: INTERVENTIONS:  -  Assess patient's ability to carry out ADLs; assess patient's baseline for ADL function and identify physical deficits which impact ability to perform ADLs (bathing, care of mouth/teeth, toileting, grooming, dressing, etc.)  - Assess/evaluate cause of self-care deficits   - Assess range of motion  - Assess patient's mobility; develop plan if impaired  - Assess patient's need for assistive devices and provide as appropriate  - Encourage maximum independence but intervene and supervise when necessary  - Involve family in performance of ADLs  - Assess for home care needs following discharge   - Consider OT consult to assist with ADL evaluation and planning for discharge  - Provide patient education as appropriate  Outcome: Adequate for Discharge  Goal: Maintains/Returns to pre admission functional level  Description: INTERVENTIONS:  - Perform AM-PAC 6 Click Basic Mobility/ Daily Activity assessment daily.  - Set and communicate daily mobility goal to care team and patient/family/caregiver.   - Collaborate with rehabilitation services on mobility goals if consulted  - Perform Range of Motion 2 times a day.  - Reposition patient every 2 hours.  - Dangle patient 2 times a day  - Stand patient 2 times a day  - Ambulate patient 3 times a day  - Out of bed to chair 3 times a day   - Out of bed for meals 3 times a day  - Out of bed for toileting  - Record patient progress and toleration of activity level   Outcome: Adequate for Discharge     Problem: DISCHARGE PLANNING  Goal: Discharge to home or other facility with appropriate  resources  Description: INTERVENTIONS:  - Identify barriers to discharge w/patient and caregiver  - Arrange for needed discharge resources and transportation as appropriate  - Identify discharge learning needs (meds, wound care, etc.)  - Arrange for interpretive services to assist at discharge as needed  - Refer to Case Management Department for coordinating discharge planning if the patient needs post-hospital services based on physician/advanced practitioner order or complex needs related to functional status, cognitive ability, or social support system  Outcome: Adequate for Discharge     Problem: Knowledge Deficit  Goal: Patient/family/caregiver demonstrates understanding of disease process, treatment plan, medications, and discharge instructions  Description: Complete learning assessment and assess knowledge base.  Interventions:  - Provide teaching at level of understanding  - Provide teaching via preferred learning methods  Outcome: Adequate for Discharge

## 2024-09-27 NOTE — DISCHARGE INSTR - AVS FIRST PAGE
Dear Flor Black,     It was our pleasure to care for you here at [facility].  It is our hope that we were always able to exceed the expected standards for your care during your stay.  You were hospitalized due to vertigo.  You were cared for on the third floor by Chriss Mcneal MD under the service of Charo Dillon MD with the [facility] who covers for your primary care physician (PCP), Diaz Brito MD, while you were hospitalized.  If you have any questions or concerns related to this hospitalization, you may contact us at .  For follow up as well as any medication refills, we recommend that you follow up with your primary care physician.  A registered nurse will reach out to you by phone within a few days after your discharge to answer any additional questions that you may have after going home.  However, at this time we provide for you here, the most important instructions / recommendations at discharge:     Notable Medication Adjustments -   Start taking 25 mg by mouth every 8 hours for vertigo  Start taking Phenergan 25 mg every 6 hours as needed for nausea or vomiting  Continue taking home medications as prescribed  Testing Required after Discharge -   None  ** Please contact your PCP to request testing orders for any of the testing recommended here **  Important follow up information -   Please follow-up with your primary care physician within 1 week  Please follow-up with vestibular physical therapy.  Referral has been made.  Other Instructions -   Please return to the emergency department if you experience severe worsening of your symptoms.  Please review this entire after visit summary as additional general instructions including medication list, appointments, activity, diet, any pertinent wound care, and other additional recommendations from your care team that may be provided for you.      Sincerely,     Chriss Mcneal MD

## 2024-10-01 ENCOUNTER — EVALUATION (OUTPATIENT)
Dept: PHYSICAL THERAPY | Facility: REHABILITATION | Age: 64
End: 2024-10-01
Payer: MEDICARE

## 2024-10-01 DIAGNOSIS — R42 VERTIGO: ICD-10-CM

## 2024-10-01 PROCEDURE — 97161 PT EVAL LOW COMPLEX 20 MIN: CPT | Performed by: PHYSICAL THERAPIST

## 2024-10-01 PROCEDURE — 97530 THERAPEUTIC ACTIVITIES: CPT | Performed by: PHYSICAL THERAPIST
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
## 2024-10-04 ENCOUNTER — OFFICE VISIT (OUTPATIENT)
Dept: PHYSICAL THERAPY | Facility: REHABILITATION | Age: 64
End: 2024-10-04
Payer: MEDICARE

## 2024-10-04 DIAGNOSIS — R42 VERTIGO: Primary | ICD-10-CM

## 2024-10-04 PROCEDURE — 97140 MANUAL THERAPY 1/> REGIONS: CPT | Performed by: PHYSICAL THERAPIST

## 2024-10-04 NOTE — PROGRESS NOTES
Daily Note     Today's date: 10/4/2024  Patient name: Flor Black  : 1960  MRN: [id number]  Referring provider: Chriss Mcneal MD  Dx:   Encounter Diagnosis     ICD-10-CM    1. Vertigo  R42           Start Time: 1012  Stop Time: 1022  Total time in clinic (min): 10 minutes    Subjective: Patient reports not taking Meclizine since Tuesday and having no symptoms.       Objective: See treatment diary below    Saccades: negative  Smooth pursuits: negative  Resting nystagmus: negative      Assessment: Tolerated treatment well. No nystagmus observed during treatment. Patient educated on if sx return that she can always return.       Plan:  D/C     Precautions: standard      POC exp =  24      Manuals 10/1 10/4           Re-assess  SW                                                  Neuro Re-Ed                                                                                                        Ther Ex                                                                                                                     Ther Activity             POC and education SW                         Gait Training                                       Modalities

## 2024-10-09 ENCOUNTER — APPOINTMENT (OUTPATIENT)
Dept: PHYSICAL THERAPY | Facility: REHABILITATION | Age: 64
End: 2024-10-09
Payer: MEDICARE

## 2024-10-25 ENCOUNTER — EVALUATION (OUTPATIENT)
Dept: PHYSICAL THERAPY | Facility: REHABILITATION | Age: 64
End: 2024-10-25
Payer: MEDICARE

## 2024-10-25 DIAGNOSIS — M54.2 NECK PAIN: Primary | ICD-10-CM

## 2024-10-25 PROCEDURE — 97164 PT RE-EVAL EST PLAN CARE: CPT | Performed by: PHYSICAL THERAPIST

## 2024-10-25 PROCEDURE — 97140 MANUAL THERAPY 1/> REGIONS: CPT | Performed by: PHYSICAL THERAPIST

## 2024-10-25 PROCEDURE — 97110 THERAPEUTIC EXERCISES: CPT | Performed by: PHYSICAL THERAPIST

## 2024-10-25 NOTE — PROGRESS NOTES
PT Evaluation     Today's date: 10/25/2024  Patient name: Flor Black  : 1960  MRN: [id number]  Referring provider: Sidra Nassar MD  Dx:   Encounter Diagnosis     ICD-10-CM    1. Neck pain  M54.2           Start Time: 1045  Stop Time: 1120  Total time in clinic (min): 35 minutes    Assessment  Impairments: abnormal or restricted ROM, activity intolerance, impaired physical strength, lacks appropriate home exercise program, pain with function, weight-bearing intolerance and poor posture   Symptom irritability: moderate    Assessment details: Problem List:  1) radial nerve tension  2) cervical hypomobility    Flor Black is a pleasant 64 y.o. female who presents with L sided pain down her arm into her hand.  she has radial nerve tension, cervical spine hypomobility, and thoracic and CTJ hypomobility resulting in the pain she is experiencing.  No further referral appears necessary at this time based upon examination results.  I expect she will improve in 4-6 weeks with consistent HEP performance and session adherence.      Understanding of Dx/Px/POC: good     Prognosis: good    Goals  ST-4 weeks  Patient will be independent with home exercise program.   Patient will be able to manage symptoms independently.  Patient will decrease pain by 25-50%    LTG: by discharge  Patient will improve FOTO to goal  Patient will report minimal (1-2/10) pain with aggravating activities to display improvements in overall functional status  Patient will have negative radial nerve tension test      Plan  Patient would benefit from: skilled physical therapy  Planned modality interventions: cryotherapy, thermotherapy: hydrocollator packs and unattended electrical stimulation    Planned therapy interventions: IADL retraining, joint mobilization, manual therapy, massage, ADL training, activity modification, abdominal trunk stabilization, ADL retraining, balance, balance/weight bearing training, neuromuscular  re-education, body mechanics training, behavior modification, strengthening, stretching, therapeutic activities, therapeutic exercise, therapeutic training, transfer training, graded exercise, graded motor, home exercise program, graded activity, gait training, functional ROM exercises, patient education, postural training, IASTM, kinesiology taping and flexibility    Frequency: 2x week  Duration in weeks: 8  Treatment plan discussed with: patient        Subjective Evaluation    History of Present Illness  Mechanism of injury: Flor is a 64 y.o. female presenting to physical therapy on 10/25/24 with referral from MD for left sided neck pain and nerve discomfort down her arm into her hand. Reports some cracking of her neck at times. Pain is felt at random times. Pain is more common at night. Does not have pain in her neck all pain is in her shoulder and into her hand.           Quality of life: good    Patient Goals  Patient goals for therapy: increased strength, independence with ADLs/IADLs, return to sport/leisure activities, decreased pain and increased motion    Pain  Current pain rating: 3  At best pain ratin  At worst pain ratin  Location: left shoulder down to hand  Quality: sharp  Exacerbated by: laying down at night.  Progression: no change          Objective    Myotomes all intact b/l  Dermatome: all intact b/l        Ligamentous Testing:  Alar Ligament: (-)  Transverse Ligament/Sharp Delvin: (-)  VBI: (-)    Palpation: TTP over R UT C5-C7 vertebrae b/l     Cervical  % of normal   Flex. 100   Extn. 50   SB Left 25   SB Right 25   ROT Left 75   ROT Right 75     Neuro Dynamic Testing:    Radial Nerve: L= (+)   R= (-)        ANTT findings: positive radial nerve tension     Radiculopathy Testin) Spurlings Test: (-)  3) Distraction Test: (-)    Segmental mobility:  Mid CS= hypomobile C5-7      CTJ=  hypomobile   TS= hypomobile       Precautions: HTN      Manuals 10/25            Cervical mobs  Sideglides GrIII QD            CTJ/thoracic mobs                          Assessment QD            Neuro Re-Ed 10/25                                                                                                       Ther Ex 10/25            Rot SNAG HEP            Radial nerve tensioners HEP            CTJ butterfly HEP                                                                HEP/education 10'            Ther Activity                                       Gait Training                                       Modalities

## 2024-10-25 NOTE — LETTER
2024    Sidra Nassar MD  [address]  [place] [address]    Patient: Flor Black   YOB: 1960   Date of Visit: 10/25/2024     Encounter Diagnosis     ICD-10-CM    1. Neck pain  M54.2           Dear Dr. Nassar:    Thank you for your recent referral of Flor Black. Please review the attached evaluation summary from Flor's recent visit.     Please verify that you agree with the plan of care by signing the attached order.     If you have any questions or concerns, please do not hesitate to call.     I sincerely appreciate the opportunity to share in the care of one of your patients and hope to have another opportunity to work with you in the near future.       Sincerely,    Maurizio Rangel      Referring Provider:      I certify that I have read the below Plan of Care and certify the need for these services furnished under this plan of treatment while under my care.                    Sidra Nassar MD  [address] [facility] [address]  Via Fax: [phone]          PT Evaluation     Today's date: 10/25/2024  Patient name: Flor lBack  : 1960  MRN: [id number]  Referring provider: Sidra Nassar MD  Dx:   Encounter Diagnosis     ICD-10-CM    1. Neck pain  M54.2           Start Time: 1045  Stop Time: 1120  Total time in clinic (min): 35 minutes    Assessment  Impairments: abnormal or restricted ROM, activity intolerance, impaired physical strength, lacks appropriate home exercise program, pain with function, weight-bearing intolerance and poor posture   Symptom irritability: moderate    Assessment details: Problem List:  1) radial nerve tension  2) cervical hypomobility    lFor Black is a pleasant 64 y.o. female who presents with L sided pain down her arm into her hand.  she has radial nerve tension, cervical spine hypomobility, and thoracic and CTJ hypomobility resulting in the pain she is experiencing.  No further referral appears necessary at this time based upon  examination results.  I expect she will improve in 4-6 weeks with consistent HEP performance and session adherence.      Understanding of Dx/Px/POC: good     Prognosis: good    Goals  ST-4 weeks  Patient will be independent with home exercise program.   Patient will be able to manage symptoms independently.  Patient will decrease pain by 25-50%    LTG: by discharge  Patient will improve FOTO to goal  Patient will report minimal (1-2/10) pain with aggravating activities to display improvements in overall functional status  Patient will have negative radial nerve tension test      Plan  Patient would benefit from: skilled physical therapy  Planned modality interventions: cryotherapy, thermotherapy: hydrocollator packs and unattended electrical stimulation    Planned therapy interventions: IADL retraining, joint mobilization, manual therapy, massage, ADL training, activity modification, abdominal trunk stabilization, ADL retraining, balance, balance/weight bearing training, neuromuscular re-education, body mechanics training, behavior modification, strengthening, stretching, therapeutic activities, therapeutic exercise, therapeutic training, transfer training, graded exercise, graded motor, home exercise program, graded activity, gait training, functional ROM exercises, patient education, postural training, IASTM, kinesiology taping and flexibility    Frequency: 2x week  Duration in weeks: 8  Treatment plan discussed with: patient        Subjective Evaluation    History of Present Illness  Mechanism of injury: Flor is a 64 y.o. female presenting to physical therapy on 10/25/24 with referral from MD for left sided neck pain and nerve discomfort down her arm into her hand. Reports some cracking of her neck at times. Pain is felt at random times. Pain is more common at night. Does not have pain in her neck all pain is in her shoulder and into her hand.           Quality of life: good    Patient Goals  Patient  goals for therapy: increased strength, independence with ADLs/IADLs, return to sport/leisure activities, decreased pain and increased motion    Pain  Current pain rating: 3  At best pain ratin  At worst pain ratin  Location: left shoulder down to hand  Quality: sharp  Exacerbated by: laying down at night.  Progression: no change          Objective    Myotomes all intact b/l  Dermatome: all intact b/l        Ligamentous Testing:  Alar Ligament: (-)  Transverse Ligament/Sharp Delvin: (-)  VBI: (-)    Palpation: TTP over R UT C5-C7 vertebrae b/l     Cervical  % of normal   Flex. 100   Extn. 50   SB Left 25   SB Right 25   ROT Left 75   ROT Right 75     Neuro Dynamic Testing:    Radial Nerve: L= (+)   R= (-)        ANTT findings: positive radial nerve tension     Radiculopathy Testin) Spurlings Test: (-)  3) Distraction Test: (-)    Segmental mobility:  Mid CS= hypomobile C5-7      CTJ=  hypomobile   TS= hypomobile       Precautions: HTN      Manuals 10/25            Cervical mobs Sideglides GrIII QD            CTJ/thoracic mobs                          Assessment QD            Neuro Re-Ed 10/25                                                                                                       Ther Ex 10/25            Rot SNAG HEP            Radial nerve tensioners HEP            CTJ butterfly HEP                                                                HEP/education 10'            Ther Activity                                       Gait Training                                       Modalities

## 2024-10-31 ENCOUNTER — APPOINTMENT (OUTPATIENT)
Dept: PHYSICAL THERAPY | Facility: REHABILITATION | Age: 64
End: 2024-10-31
Payer: MEDICARE

## 2024-11-07 ENCOUNTER — OFFICE VISIT (OUTPATIENT)
Dept: PHYSICAL THERAPY | Facility: REHABILITATION | Age: 64
End: 2024-11-07
Payer: MEDICARE

## 2024-11-07 DIAGNOSIS — M54.2 NECK PAIN: Primary | ICD-10-CM

## 2024-11-07 PROCEDURE — 97110 THERAPEUTIC EXERCISES: CPT | Performed by: PHYSICAL THERAPIST

## 2024-11-07 PROCEDURE — 97140 MANUAL THERAPY 1/> REGIONS: CPT | Performed by: PHYSICAL THERAPIST

## 2024-11-07 NOTE — PROGRESS NOTES
Daily Note     Today's date: 2024  Patient name: Flor Black  : 1960  MRN: [id number]  Referring provider: Sidra Nassar MD  Dx:   Encounter Diagnosis     ICD-10-CM    1. Neck pain  M54.2           Start Time: 1600  Stop Time: 1630  Total time in clinic (min): 30 minutes    Subjective: Reports she is still stiff. Has improved arm pain, but still has discomfort in her neck.       Objective: See treatment diary below      Assessment: Tolerated treatment well. Patient exhibited good technique with therapeutic exercises and would benefit from continued PT      Plan: Continue per plan of care.      Precautions: HTN      Manuals 10/25 11/7           Cervical mobs Sideglides GrIII QD Sideglides GrIV QD    MET 5x           C1-2 mob  MET L rot 3x                        CTJ/thoracic mobs                          Assessment QD QD           Neuro Re-Ed 10/25                                                                                                       Ther Ex 10/25 11/7           Rot SNAG HEP 2x10    Ext 2x10           Radial nerve tensioners HEP            CTJ butterfly HEP            Chin tuck  2x10                                                  HEP/education 10' 5'           Ther Activity                                       Gait Training                                       Modalities

## 2024-11-14 ENCOUNTER — OFFICE VISIT (OUTPATIENT)
Dept: PHYSICAL THERAPY | Facility: REHABILITATION | Age: 64
End: 2024-11-14
Payer: MEDICARE

## 2024-11-14 DIAGNOSIS — M54.2 NECK PAIN: Primary | ICD-10-CM

## 2024-11-14 PROCEDURE — 97110 THERAPEUTIC EXERCISES: CPT | Performed by: PHYSICAL THERAPIST

## 2024-11-14 PROCEDURE — 97140 MANUAL THERAPY 1/> REGIONS: CPT | Performed by: PHYSICAL THERAPIST

## 2024-11-14 NOTE — PROGRESS NOTES
Daily Note     Today's date: 2024  Patient name: Flor Black  : 1960  MRN: [id number]  Referring provider: Sidra Nassar MD  Dx:   Encounter Diagnosis     ICD-10-CM    1. Neck pain  M54.2           Start Time: 1600  Stop Time: 1630  Total time in clinic (min): 30 minutes    Subjective: Reports she is still stiff. Has improved arm pain, but still has discomfort in her neck.       Objective: See treatment diary below      Assessment: Tolerated treatment well. Patient exhibited good technique with therapeutic exercises and would benefit from continued PT      Plan: Continue per plan of care.      Precautions: HTN      Manuals 10/25 11/7 11/14          Cervical mobs Sideglides GrIII QD Sideglides GrIV QD    MET 5x Side glides GrIV 5 rounds          C1-2 mob  MET L rot 3x                        CTJ/thoracic mobs                          Assessment QD QD QD          Neuro Re-Ed 10/25  11/14                                                                                                     Ther Ex 10/25 11/7 11/14          Rot SNAG HEP 2x10    Ext 2x10 2x10    2x10 ext          Radial nerve tensioners HEP  3x10          CTJ butterfly HEP            Chin tuck  2x10 2x10          Ext/row   2x10 purp ext    Andrea 20lb row                                    HEP/education 10' 5'           Ther Activity                                       Gait Training                                       Modalities

## 2024-11-21 ENCOUNTER — OFFICE VISIT (OUTPATIENT)
Dept: PHYSICAL THERAPY | Facility: REHABILITATION | Age: 64
End: 2024-11-21
Payer: MEDICARE

## 2024-11-21 DIAGNOSIS — M54.2 NECK PAIN: Primary | ICD-10-CM

## 2024-11-21 PROCEDURE — 97140 MANUAL THERAPY 1/> REGIONS: CPT | Performed by: PHYSICAL THERAPIST

## 2024-11-21 PROCEDURE — 97110 THERAPEUTIC EXERCISES: CPT | Performed by: PHYSICAL THERAPIST

## 2024-11-21 NOTE — PROGRESS NOTES
Daily Note     Today's date: 2024  Patient name: Flor Black  : 1960  MRN: [id number]  Referring provider: Sidra Nassar MD  Dx:   Encounter Diagnosis     ICD-10-CM    1. Neck pain  M54.2           Start Time: 1600  Stop Time: 1630  Total time in clinic (min): 30 minutes    Subjective: Reports she is doing better. Not really feeling her arm much and neck has felt better.       Objective: See treatment diary below      Assessment: Tolerated treatment well. Improved spinal mobility noted. Patient exhibited good technique with therapeutic exercises and would benefit from continued PT      Plan: Continue per plan of care.      Precautions: HTN      Manuals 10/25 11/7 11/14 11/21         Cervical mobs Sideglides GrIII QD Sideglides GrIV QD    MET 5x Side glides GrIV 5 rounds Side glides GrIV 5 rounds         C1-2 mob  MET L rot 3x           UT STM    QD         CTJ/thoracic mobs                          Assessment QD QD QD QD         Neuro Re-Ed 10/25  11/14 11/21                                                                                                    Ther Ex 10/25 11/7 11/14 11/21         Rot SNAG HEP 2x10    Ext 2x10 2x10    2x10 ext 2x10    2x10 ext         Radial nerve tensioners HEP  3x10 3x10         CTJ butterfly HEP            Chin tuck  2x10 2x10 2x10         Ext/row   2x10 purp ext    Andrea 20lb row 2x10 purp ext                                           HEP/education 10' 5'           Ther Activity                                       Gait Training                                       Modalities

## 2024-11-27 ENCOUNTER — APPOINTMENT (OUTPATIENT)
Dept: PHYSICAL THERAPY | Facility: REHABILITATION | Age: 64
End: 2024-11-27
Payer: MEDICARE

## 2024-12-05 ENCOUNTER — OFFICE VISIT (OUTPATIENT)
Dept: PHYSICAL THERAPY | Facility: REHABILITATION | Age: 64
End: 2024-12-05
Payer: MEDICARE

## 2024-12-05 DIAGNOSIS — M54.2 NECK PAIN: Primary | ICD-10-CM

## 2024-12-05 PROCEDURE — 97110 THERAPEUTIC EXERCISES: CPT | Performed by: PHYSICAL THERAPIST

## 2024-12-05 PROCEDURE — 97140 MANUAL THERAPY 1/> REGIONS: CPT | Performed by: PHYSICAL THERAPIST

## 2024-12-05 NOTE — PROGRESS NOTES
Daily Note     Today's date: 2024  Patient name: Flor Black  : 1960  MRN: [id number]  Referring provider: Sidra Nassar MD  Dx:   Encounter Diagnosis     ICD-10-CM    1. Neck pain  M54.2           Start Time: 1700  Stop Time: 1730  Total time in clinic (min): 30 minutes    Subjective: Reports she is doing a lot better. Neck has been good and pain down the arm into the hand has been infrequent. Max pain /10.       Objective: See treatment diary below      Assessment: Tolerated treatment well. D/C due to improved functional status and decreased pain.       Goals  ST-4 weeks  Patient will be independent with home exercise program. MET  Patient will be able to manage symptoms independently. MET  Patient will decrease pain by 25-50% MET    LTG: by discharge  Patient will improve FOTO to goal MET  Patient will report minimal (1-2/10) pain with aggravating activities to display improvements in overall functional status MET  Patient will have negative radial nerve tension test MET    Plan: Continue per plan of care.      Precautions: HTN      Manuals 10/25 11/7 11/14 11/21 12        Cervical mobs Sideglides GrIII QD Sideglides GrIV QD    MET 5x Side glides GrIV 5 rounds Side glides GrIV 5 rounds Side glides GrIV 5 rounds        C1-2 mob  MET L rot 3x           UT STM    QD QD        CTJ/thoracic mobs                          Assessment QD QD QD QD QD        Neuro Re-Ed 10/25  11/14 11/21 12/                                                                                                   Ther Ex 10/25 11/7 11/14 11/21 12        Rot SNAG HEP 2x10    Ext 2x10 2x10    2x10 ext 2x10    2x10 ext 2x10    2x10 ext        Radial nerve tensioners HEP  3x10 3x10 3x10        CTJ butterfly HEP            Chin tuck  2x10 2x10 2x10 30x        Ext/row   2x10 purp ext    Andrea 20lb row 2x10 purp ext                                           HEP/education 10' 5'           Ther Activity                                        Gait Training                                       Modalities